# Patient Record
Sex: MALE | Race: BLACK OR AFRICAN AMERICAN | NOT HISPANIC OR LATINO | Employment: UNEMPLOYED | URBAN - METROPOLITAN AREA
[De-identification: names, ages, dates, MRNs, and addresses within clinical notes are randomized per-mention and may not be internally consistent; named-entity substitution may affect disease eponyms.]

---

## 2019-01-20 ENCOUNTER — APPOINTMENT (EMERGENCY)
Dept: RADIOLOGY | Facility: HOSPITAL | Age: 34
DRG: 917 | End: 2019-01-20

## 2019-01-20 ENCOUNTER — HOSPITAL ENCOUNTER (INPATIENT)
Facility: HOSPITAL | Age: 34
LOS: 1 days | Discharge: HOME | DRG: 917 | End: 2019-01-22
Attending: EMERGENCY MEDICINE | Admitting: EMERGENCY MEDICINE

## 2019-01-20 DIAGNOSIS — S05.01XA ABRASION OF RIGHT CORNEA, INITIAL ENCOUNTER: ICD-10-CM

## 2019-01-20 DIAGNOSIS — T50.901A DRUG OVERDOSE: ICD-10-CM

## 2019-01-20 DIAGNOSIS — H57.12 EYE PAIN, LEFT: ICD-10-CM

## 2019-01-20 DIAGNOSIS — R46.89 AGGRESSIVE BEHAVIOR: ICD-10-CM

## 2019-01-20 DIAGNOSIS — H57.11 EYE PAIN, RIGHT: ICD-10-CM

## 2019-01-20 DIAGNOSIS — R41.82 ALTERED MENTAL STATE: Primary | ICD-10-CM

## 2019-01-20 LAB
ALBUMIN SERPL BCP-MCNC: 4.4 G/DL (ref 3.5–5)
ALP SERPL-CCNC: 67 U/L (ref 46–116)
ALT SERPL W P-5'-P-CCNC: 51 U/L (ref 12–78)
AMPHETAMINES SERPL QL SCN: NEGATIVE
ANION GAP SERPL CALCULATED.3IONS-SCNC: 14 MMOL/L (ref 4–13)
APAP SERPL-MCNC: <2 UG/ML (ref 10–30)
AST SERPL W P-5'-P-CCNC: 22 U/L (ref 5–45)
BARBITURATES UR QL: NEGATIVE
BASOPHILS # BLD AUTO: 0.07 THOUSANDS/ΜL (ref 0–0.1)
BASOPHILS NFR BLD AUTO: 1 % (ref 0–1)
BENZODIAZ UR QL: NEGATIVE
BILIRUB SERPL-MCNC: 0.34 MG/DL (ref 0.2–1)
BUN SERPL-MCNC: 7 MG/DL (ref 5–25)
CALCIUM SERPL-MCNC: 9.3 MG/DL (ref 8.3–10.1)
CHLORIDE SERPL-SCNC: 104 MMOL/L (ref 100–108)
CO2 SERPL-SCNC: 21 MMOL/L (ref 21–32)
COCAINE UR QL: POSITIVE
CREAT SERPL-MCNC: 1.16 MG/DL (ref 0.6–1.3)
EOSINOPHIL # BLD AUTO: 0.22 THOUSAND/ΜL (ref 0–0.61)
EOSINOPHIL NFR BLD AUTO: 3 % (ref 0–6)
ERYTHROCYTE [DISTWIDTH] IN BLOOD BY AUTOMATED COUNT: 14.3 % (ref 11.6–15.1)
ETHANOL SERPL-MCNC: 27 MG/DL (ref 0–3)
GFR SERPL CREATININE-BSD FRML MDRD: 96 ML/MIN/1.73SQ M
GLUCOSE SERPL-MCNC: 102 MG/DL (ref 65–140)
HCT VFR BLD AUTO: 49.2 % (ref 36.5–49.3)
HGB BLD-MCNC: 15.9 G/DL (ref 12–17)
IMM GRANULOCYTES # BLD AUTO: 0.02 THOUSAND/UL (ref 0–0.2)
IMM GRANULOCYTES NFR BLD AUTO: 0 % (ref 0–2)
LYMPHOCYTES # BLD AUTO: 2.66 THOUSANDS/ΜL (ref 0.6–4.47)
LYMPHOCYTES NFR BLD AUTO: 36 % (ref 14–44)
MAGNESIUM SERPL-MCNC: 2 MG/DL (ref 1.6–2.6)
MCH RBC QN AUTO: 27.4 PG (ref 26.8–34.3)
MCHC RBC AUTO-ENTMCNC: 32.3 G/DL (ref 31.4–37.4)
MCV RBC AUTO: 85 FL (ref 82–98)
METHADONE UR QL: NEGATIVE
MONOCYTES # BLD AUTO: 0.74 THOUSAND/ΜL (ref 0.17–1.22)
MONOCYTES NFR BLD AUTO: 10 % (ref 4–12)
NEUTROPHILS # BLD AUTO: 3.62 THOUSANDS/ΜL (ref 1.85–7.62)
NEUTS SEG NFR BLD AUTO: 50 % (ref 43–75)
NRBC BLD AUTO-RTO: 0 /100 WBCS
OPIATES UR QL SCN: NEGATIVE
PCP UR QL: POSITIVE
PLATELET # BLD AUTO: 201 THOUSANDS/UL (ref 149–390)
PMV BLD AUTO: 12.2 FL (ref 8.9–12.7)
POTASSIUM SERPL-SCNC: 3.7 MMOL/L (ref 3.5–5.3)
PROT SERPL-MCNC: 8 G/DL (ref 6.4–8.2)
RBC # BLD AUTO: 5.8 MILLION/UL (ref 3.88–5.62)
SALICYLATES SERPL-MCNC: 5 MG/DL (ref 3–20)
SODIUM SERPL-SCNC: 139 MMOL/L (ref 136–145)
THC UR QL: POSITIVE
WBC # BLD AUTO: 7.33 THOUSAND/UL (ref 4.31–10.16)

## 2019-01-20 PROCEDURE — 80307 DRUG TEST PRSMV CHEM ANLYZR: CPT | Performed by: EMERGENCY MEDICINE

## 2019-01-20 PROCEDURE — 5A1935Z RESPIRATORY VENTILATION, LESS THAN 24 CONSECUTIVE HOURS: ICD-10-PCS | Performed by: EMERGENCY MEDICINE

## 2019-01-20 PROCEDURE — 80320 DRUG SCREEN QUANTALCOHOLS: CPT | Performed by: EMERGENCY MEDICINE

## 2019-01-20 PROCEDURE — 80053 COMPREHEN METABOLIC PANEL: CPT | Performed by: EMERGENCY MEDICINE

## 2019-01-20 PROCEDURE — 36415 COLL VENOUS BLD VENIPUNCTURE: CPT | Performed by: EMERGENCY MEDICINE

## 2019-01-20 PROCEDURE — 93005 ELECTROCARDIOGRAM TRACING: CPT

## 2019-01-20 PROCEDURE — 96360 HYDRATION IV INFUSION INIT: CPT

## 2019-01-20 PROCEDURE — 0BJ08ZZ INSPECTION OF TRACHEOBRONCHIAL TREE, VIA NATURAL OR ARTIFICIAL OPENING ENDOSCOPIC: ICD-10-PCS | Performed by: EMERGENCY MEDICINE

## 2019-01-20 PROCEDURE — 85025 COMPLETE CBC W/AUTO DIFF WBC: CPT | Performed by: EMERGENCY MEDICINE

## 2019-01-20 PROCEDURE — 83735 ASSAY OF MAGNESIUM: CPT | Performed by: EMERGENCY MEDICINE

## 2019-01-20 PROCEDURE — 90715 TDAP VACCINE 7 YRS/> IM: CPT | Performed by: EMERGENCY MEDICINE

## 2019-01-20 PROCEDURE — 0BH17EZ INSERTION OF ENDOTRACHEAL AIRWAY INTO TRACHEA, VIA NATURAL OR ARTIFICIAL OPENING: ICD-10-PCS | Performed by: EMERGENCY MEDICINE

## 2019-01-20 PROCEDURE — 94002 VENT MGMT INPAT INIT DAY: CPT

## 2019-01-20 PROCEDURE — 80329 ANALGESICS NON-OPIOID 1 OR 2: CPT | Performed by: EMERGENCY MEDICINE

## 2019-01-20 PROCEDURE — 99285 EMERGENCY DEPT VISIT HI MDM: CPT

## 2019-01-20 PROCEDURE — 70450 CT HEAD/BRAIN W/O DYE: CPT

## 2019-01-20 PROCEDURE — 73130 X-RAY EXAM OF HAND: CPT

## 2019-01-20 PROCEDURE — 94760 N-INVAS EAR/PLS OXIMETRY 1: CPT

## 2019-01-20 PROCEDURE — 71045 X-RAY EXAM CHEST 1 VIEW: CPT

## 2019-01-20 PROCEDURE — 90471 IMMUNIZATION ADMIN: CPT

## 2019-01-20 RX ORDER — VECURONIUM BROMIDE 1 MG/ML
10 INJECTION, POWDER, LYOPHILIZED, FOR SOLUTION INTRAVENOUS ONCE
Status: COMPLETED | OUTPATIENT
Start: 2019-01-20 | End: 2019-01-20

## 2019-01-20 RX ORDER — SUCCINYLCHOLINE CHLORIDE 20 MG/ML
200 INJECTION INTRAMUSCULAR; INTRAVENOUS ONCE
Status: COMPLETED | OUTPATIENT
Start: 2019-01-20 | End: 2019-01-20

## 2019-01-20 RX ORDER — CHLORHEXIDINE GLUCONATE 0.12 MG/ML
15 RINSE ORAL EVERY 12 HOURS SCHEDULED
Status: DISCONTINUED | OUTPATIENT
Start: 2019-01-20 | End: 2019-01-21

## 2019-01-20 RX ORDER — PROPOFOL 10 MG/ML
5-50 INJECTION, EMULSION INTRAVENOUS
Status: DISCONTINUED | OUTPATIENT
Start: 2019-01-20 | End: 2019-01-21

## 2019-01-20 RX ADMIN — Medication 200 MG: at 22:29

## 2019-01-20 RX ADMIN — SODIUM CHLORIDE 1000 ML: 0.9 INJECTION, SOLUTION INTRAVENOUS at 22:44

## 2019-01-20 RX ADMIN — VECURONIUM BROMIDE 10 MG: 10 INJECTION, POWDER, LYOPHILIZED, FOR SOLUTION INTRAVENOUS at 22:30

## 2019-01-20 RX ADMIN — TETANUS TOXOID, REDUCED DIPHTHERIA TOXOID AND ACELLULAR PERTUSSIS VACCINE, ADSORBED 0.5 ML: 5; 2.5; 8; 8; 2.5 SUSPENSION INTRAMUSCULAR at 22:53

## 2019-01-20 RX ADMIN — PROPOFOL 20 MCG/KG/MIN: 10 INJECTION, EMULSION INTRAVENOUS at 22:43

## 2019-01-21 ENCOUNTER — APPOINTMENT (INPATIENT)
Dept: RADIOLOGY | Facility: HOSPITAL | Age: 34
DRG: 917 | End: 2019-01-21

## 2019-01-21 PROBLEM — T50.901A DRUG OVERDOSE: Status: ACTIVE | Noted: 2019-01-21

## 2019-01-21 PROBLEM — E87.2 METABOLIC ACIDOSIS: Status: ACTIVE | Noted: 2019-01-21

## 2019-01-21 PROBLEM — F19.10 POLYSUBSTANCE ABUSE (HCC): Status: ACTIVE | Noted: 2019-01-21

## 2019-01-21 PROBLEM — E87.20 METABOLIC ACIDOSIS: Status: ACTIVE | Noted: 2019-01-21

## 2019-01-21 PROBLEM — N17.9 ACUTE KIDNEY INJURY (HCC): Status: ACTIVE | Noted: 2019-01-21

## 2019-01-21 PROBLEM — J96.01 ACUTE RESPIRATORY FAILURE WITH HYPOXIA (HCC): Status: ACTIVE | Noted: 2019-01-21

## 2019-01-21 PROBLEM — R41.82 ALTERED MENTAL STATE: Status: ACTIVE | Noted: 2019-01-21

## 2019-01-21 PROBLEM — E87.6 HYPOKALEMIA: Status: ACTIVE | Noted: 2019-01-21

## 2019-01-21 PROBLEM — R46.89 AGGRESSIVE BEHAVIOR: Status: ACTIVE | Noted: 2019-01-21

## 2019-01-21 LAB
ANION GAP SERPL CALCULATED.3IONS-SCNC: 7 MMOL/L (ref 4–13)
ARTERIAL PATENCY WRIST A: YES
ATRIAL RATE: 93 BPM
BASE EXCESS BLDA CALC-SCNC: 0.8 MMOL/L
BASOPHILS # BLD AUTO: 0.04 THOUSANDS/ΜL (ref 0–0.1)
BASOPHILS NFR BLD AUTO: 1 % (ref 0–1)
BUN SERPL-MCNC: 8 MG/DL (ref 5–25)
CALCIUM SERPL-MCNC: 8.3 MG/DL (ref 8.3–10.1)
CHLORIDE SERPL-SCNC: 108 MMOL/L (ref 100–108)
CO2 SERPL-SCNC: 25 MMOL/L (ref 21–32)
CREAT SERPL-MCNC: 0.86 MG/DL (ref 0.6–1.3)
EOSINOPHIL # BLD AUTO: 0.04 THOUSAND/ΜL (ref 0–0.61)
EOSINOPHIL NFR BLD AUTO: 1 % (ref 0–6)
ERYTHROCYTE [DISTWIDTH] IN BLOOD BY AUTOMATED COUNT: 13.9 % (ref 11.6–15.1)
GFR SERPL CREATININE-BSD FRML MDRD: 134 ML/MIN/1.73SQ M
GLUCOSE SERPL-MCNC: 91 MG/DL (ref 65–140)
HCO3 BLDA-SCNC: 24.8 MMOL/L (ref 22–28)
HCT VFR BLD AUTO: 42.5 % (ref 36.5–49.3)
HGB BLD-MCNC: 14 G/DL (ref 12–17)
HOROWITZ INDEX BLDA+IHG-RTO: 50 MM[HG]
I-TIME: 1
IMM GRANULOCYTES # BLD AUTO: 0.02 THOUSAND/UL (ref 0–0.2)
IMM GRANULOCYTES NFR BLD AUTO: 0 % (ref 0–2)
LYMPHOCYTES # BLD AUTO: 1.01 THOUSANDS/ΜL (ref 0.6–4.47)
LYMPHOCYTES NFR BLD AUTO: 13 % (ref 14–44)
MAGNESIUM SERPL-MCNC: 2 MG/DL (ref 1.6–2.6)
MCH RBC QN AUTO: 26.9 PG (ref 26.8–34.3)
MCHC RBC AUTO-ENTMCNC: 32.9 G/DL (ref 31.4–37.4)
MCV RBC AUTO: 82 FL (ref 82–98)
MONOCYTES # BLD AUTO: 0.46 THOUSAND/ΜL (ref 0.17–1.22)
MONOCYTES NFR BLD AUTO: 6 % (ref 4–12)
NEUTROPHILS # BLD AUTO: 6.02 THOUSANDS/ΜL (ref 1.85–7.62)
NEUTS SEG NFR BLD AUTO: 79 % (ref 43–75)
NRBC BLD AUTO-RTO: 0 /100 WBCS
O2 CT BLDA-SCNC: 21 ML/DL (ref 16–23)
OXYHGB MFR BLDA: 97.2 % (ref 94–97)
P AXIS: 58 DEGREES
PCO2 BLDA: 37.9 MM HG (ref 36–44)
PEEP RESPIRATORY: 8 CM[H2O]
PH BLDA: 7.43 [PH] (ref 7.35–7.45)
PHOSPHATE SERPL-MCNC: 2 MG/DL (ref 2.7–4.5)
PLATELET # BLD AUTO: 169 THOUSANDS/UL (ref 149–390)
PMV BLD AUTO: 12 FL (ref 8.9–12.7)
PO2 BLDA: 141.2 MM HG (ref 75–129)
POTASSIUM SERPL-SCNC: 3.5 MMOL/L (ref 3.5–5.3)
PR INTERVAL: 160 MS
PRESSURE CONTROL: 18
PROCALCITONIN SERPL-MCNC: <0.05 NG/ML
QRS AXIS: 51 DEGREES
QRSD INTERVAL: 94 MS
QT INTERVAL: 336 MS
QTC INTERVAL: 417 MS
RBC # BLD AUTO: 5.21 MILLION/UL (ref 3.88–5.62)
SODIUM SERPL-SCNC: 140 MMOL/L (ref 136–145)
SPECIMEN SOURCE: ABNORMAL
T WAVE AXIS: 19 DEGREES
VENT AC: 14
VENT- AC: AC
VENTRICULAR RATE: 93 BPM
WBC # BLD AUTO: 7.59 THOUSAND/UL (ref 4.31–10.16)

## 2019-01-21 PROCEDURE — 87040 BLOOD CULTURE FOR BACTERIA: CPT | Performed by: INTERNAL MEDICINE

## 2019-01-21 PROCEDURE — 90686 IIV4 VACC NO PRSV 0.5 ML IM: CPT | Performed by: EMERGENCY MEDICINE

## 2019-01-21 PROCEDURE — 93010 ELECTROCARDIOGRAM REPORT: CPT | Performed by: INTERNAL MEDICINE

## 2019-01-21 PROCEDURE — 99291 CRITICAL CARE FIRST HOUR: CPT | Performed by: EMERGENCY MEDICINE

## 2019-01-21 PROCEDURE — 82805 BLOOD GASES W/O2 SATURATION: CPT | Performed by: EMERGENCY MEDICINE

## 2019-01-21 PROCEDURE — 87070 CULTURE OTHR SPECIMN AEROBIC: CPT | Performed by: EMERGENCY MEDICINE

## 2019-01-21 PROCEDURE — 94003 VENT MGMT INPAT SUBQ DAY: CPT

## 2019-01-21 PROCEDURE — 84100 ASSAY OF PHOSPHORUS: CPT | Performed by: EMERGENCY MEDICINE

## 2019-01-21 PROCEDURE — 94640 AIRWAY INHALATION TREATMENT: CPT

## 2019-01-21 PROCEDURE — 84145 PROCALCITONIN (PCT): CPT | Performed by: PHYSICIAN ASSISTANT

## 2019-01-21 PROCEDURE — 94760 N-INVAS EAR/PLS OXIMETRY 1: CPT

## 2019-01-21 PROCEDURE — 99254 IP/OBS CNSLTJ NEW/EST MOD 60: CPT | Performed by: PSYCHIATRY & NEUROLOGY

## 2019-01-21 PROCEDURE — 80048 BASIC METABOLIC PNL TOTAL CA: CPT | Performed by: EMERGENCY MEDICINE

## 2019-01-21 PROCEDURE — 83735 ASSAY OF MAGNESIUM: CPT | Performed by: EMERGENCY MEDICINE

## 2019-01-21 PROCEDURE — 96374 THER/PROPH/DIAG INJ IV PUSH: CPT

## 2019-01-21 PROCEDURE — 36600 WITHDRAWAL OF ARTERIAL BLOOD: CPT

## 2019-01-21 PROCEDURE — 85025 COMPLETE CBC W/AUTO DIFF WBC: CPT | Performed by: EMERGENCY MEDICINE

## 2019-01-21 PROCEDURE — 71045 X-RAY EXAM CHEST 1 VIEW: CPT

## 2019-01-21 RX ORDER — PROPARACAINE HYDROCHLORIDE 5 MG/ML
1 SOLUTION/ DROPS OPHTHALMIC ONCE
Status: DISCONTINUED | OUTPATIENT
Start: 2019-01-21 | End: 2019-01-21

## 2019-01-21 RX ORDER — LIDOCAINE HYDROCHLORIDE 10 MG/ML
10 INJECTION, SOLUTION EPIDURAL; INFILTRATION; INTRACAUDAL; PERINEURAL ONCE
Status: DISCONTINUED | OUTPATIENT
Start: 2019-01-21 | End: 2019-01-21

## 2019-01-21 RX ORDER — PROPARACAINE HYDROCHLORIDE 5 MG/ML
1 SOLUTION/ DROPS OPHTHALMIC ONCE
Status: COMPLETED | OUTPATIENT
Start: 2019-01-21 | End: 2019-01-21

## 2019-01-21 RX ORDER — SODIUM CHLORIDE, SODIUM GLUCONATE, SODIUM ACETATE, POTASSIUM CHLORIDE, MAGNESIUM CHLORIDE, SODIUM PHOSPHATE, DIBASIC, AND POTASSIUM PHOSPHATE .53; .5; .37; .037; .03; .012; .00082 G/100ML; G/100ML; G/100ML; G/100ML; G/100ML; G/100ML; G/100ML
125 INJECTION, SOLUTION INTRAVENOUS CONTINUOUS
Status: DISCONTINUED | OUTPATIENT
Start: 2019-01-21 | End: 2019-01-21

## 2019-01-21 RX ORDER — KETAMINE HCL IN NACL, ISO-OSM 100MG/10ML
100 SYRINGE (ML) INJECTION ONCE
Status: COMPLETED | OUTPATIENT
Start: 2019-01-21 | End: 2019-01-21

## 2019-01-21 RX ORDER — POTASSIUM CHLORIDE 20MEQ/15ML
40 LIQUID (ML) ORAL ONCE
Status: DISCONTINUED | OUTPATIENT
Start: 2019-01-21 | End: 2019-01-21

## 2019-01-21 RX ORDER — SODIUM CHLORIDE, SODIUM GLUCONATE, SODIUM ACETATE, POTASSIUM CHLORIDE, MAGNESIUM CHLORIDE, SODIUM PHOSPHATE, DIBASIC, AND POTASSIUM PHOSPHATE .53; .5; .37; .037; .03; .012; .00082 G/100ML; G/100ML; G/100ML; G/100ML; G/100ML; G/100ML; G/100ML
1000 INJECTION, SOLUTION INTRAVENOUS ONCE
Status: COMPLETED | OUTPATIENT
Start: 2019-01-21 | End: 2019-01-21

## 2019-01-21 RX ORDER — OXYCODONE HYDROCHLORIDE 5 MG/1
5 TABLET ORAL EVERY 6 HOURS PRN
Status: DISCONTINUED | OUTPATIENT
Start: 2019-01-21 | End: 2019-01-21

## 2019-01-21 RX ORDER — VECURONIUM BROMIDE 1 MG/ML
10 INJECTION, POWDER, LYOPHILIZED, FOR SOLUTION INTRAVENOUS ONCE
Status: DISCONTINUED | OUTPATIENT
Start: 2019-01-21 | End: 2019-01-21

## 2019-01-21 RX ORDER — FENTANYL CITRATE 50 UG/ML
200 INJECTION, SOLUTION INTRAMUSCULAR; INTRAVENOUS ONCE
Status: COMPLETED | OUTPATIENT
Start: 2019-01-21 | End: 2019-01-21

## 2019-01-21 RX ORDER — ALBUTEROL SULFATE 2.5 MG/3ML
SOLUTION RESPIRATORY (INHALATION)
Status: COMPLETED
Start: 2019-01-21 | End: 2019-01-21

## 2019-01-21 RX ORDER — ERYTHROMYCIN 5 MG/G
0.5 OINTMENT OPHTHALMIC
Status: DISCONTINUED | OUTPATIENT
Start: 2019-01-21 | End: 2019-01-22 | Stop reason: HOSPADM

## 2019-01-21 RX ORDER — POTASSIUM CHLORIDE 20MEQ/15ML
20 LIQUID (ML) ORAL ONCE
Status: COMPLETED | OUTPATIENT
Start: 2019-01-21 | End: 2019-01-21

## 2019-01-21 RX ORDER — SODIUM CHLORIDE, SODIUM GLUCONATE, SODIUM ACETATE, POTASSIUM CHLORIDE, MAGNESIUM CHLORIDE, SODIUM PHOSPHATE, DIBASIC, AND POTASSIUM PHOSPHATE .53; .5; .37; .037; .03; .012; .00082 G/100ML; G/100ML; G/100ML; G/100ML; G/100ML; G/100ML; G/100ML
1000 INJECTION, SOLUTION INTRAVENOUS ONCE
Status: DISCONTINUED | OUTPATIENT
Start: 2019-01-21 | End: 2019-01-21

## 2019-01-21 RX ORDER — NICOTINE 21 MG/24HR
14 PATCH, TRANSDERMAL 24 HOURS TRANSDERMAL DAILY
Status: DISCONTINUED | OUTPATIENT
Start: 2019-01-22 | End: 2019-01-21

## 2019-01-21 RX ORDER — FENTANYL CITRATE 50 UG/ML
200 INJECTION, SOLUTION INTRAMUSCULAR; INTRAVENOUS ONCE
Status: DISCONTINUED | OUTPATIENT
Start: 2019-01-21 | End: 2019-01-21

## 2019-01-21 RX ORDER — TRAMADOL HYDROCHLORIDE 50 MG/1
50 TABLET ORAL EVERY 6 HOURS PRN
Status: DISCONTINUED | OUTPATIENT
Start: 2019-01-21 | End: 2019-01-21

## 2019-01-21 RX ORDER — LIDOCAINE HYDROCHLORIDE 10 MG/ML
10 INJECTION, SOLUTION EPIDURAL; INFILTRATION; INTRACAUDAL; PERINEURAL ONCE
Status: COMPLETED | OUTPATIENT
Start: 2019-01-21 | End: 2019-01-21

## 2019-01-21 RX ORDER — POTASSIUM CHLORIDE 20MEQ/15ML
20 LIQUID (ML) ORAL ONCE
Status: DISCONTINUED | OUTPATIENT
Start: 2019-01-21 | End: 2019-01-21

## 2019-01-21 RX ORDER — TRAMADOL HYDROCHLORIDE 50 MG/1
50 TABLET ORAL EVERY 8 HOURS PRN
Status: DISCONTINUED | OUTPATIENT
Start: 2019-01-21 | End: 2019-01-22 | Stop reason: HOSPADM

## 2019-01-21 RX ORDER — ALBUTEROL SULFATE 90 UG/1
2 AEROSOL, METERED RESPIRATORY (INHALATION) EVERY 6 HOURS PRN
COMMUNITY

## 2019-01-21 RX ORDER — LIDOCAINE HYDROCHLORIDE 10 MG/ML
INJECTION, SOLUTION EPIDURAL; INFILTRATION; INTRACAUDAL; PERINEURAL
Status: COMPLETED
Start: 2019-01-21 | End: 2019-01-21

## 2019-01-21 RX ORDER — CHLORHEXIDINE GLUCONATE 0.12 MG/ML
15 RINSE ORAL EVERY 12 HOURS SCHEDULED
Status: DISCONTINUED | OUTPATIENT
Start: 2019-01-21 | End: 2019-01-21

## 2019-01-21 RX ORDER — SUCCINYLCHOLINE CHLORIDE 20 MG/ML
200 INJECTION INTRAMUSCULAR; INTRAVENOUS ONCE
Status: DISCONTINUED | OUTPATIENT
Start: 2019-01-21 | End: 2019-01-21

## 2019-01-21 RX ORDER — KETAMINE HCL IN NACL, ISO-OSM 100MG/10ML
100 SYRINGE (ML) INJECTION ONCE
Status: DISCONTINUED | OUTPATIENT
Start: 2019-01-21 | End: 2019-01-21

## 2019-01-21 RX ORDER — ACETAMINOPHEN 325 MG/1
975 TABLET ORAL EVERY 8 HOURS PRN
Status: DISCONTINUED | OUTPATIENT
Start: 2019-01-21 | End: 2019-01-22 | Stop reason: HOSPADM

## 2019-01-21 RX ORDER — VECURONIUM BROMIDE 1 MG/ML
INJECTION, POWDER, LYOPHILIZED, FOR SOLUTION INTRAVENOUS
Status: COMPLETED
Start: 2019-01-21 | End: 2019-01-21

## 2019-01-21 RX ORDER — POLYVINYL ALCOHOL 14 MG/ML
2 SOLUTION/ DROPS OPHTHALMIC
Status: DISCONTINUED | OUTPATIENT
Start: 2019-01-21 | End: 2019-01-22 | Stop reason: HOSPADM

## 2019-01-21 RX ORDER — PHENYLEPHRINE HCL 2.5 %
1 DROPS OPHTHALMIC (EYE) ONCE
Status: DISCONTINUED | OUTPATIENT
Start: 2019-01-21 | End: 2019-01-21

## 2019-01-21 RX ORDER — ALBUTEROL SULFATE 2.5 MG/3ML
2.5 SOLUTION RESPIRATORY (INHALATION) EVERY 4 HOURS PRN
Status: DISCONTINUED | OUTPATIENT
Start: 2019-01-21 | End: 2019-01-22 | Stop reason: HOSPADM

## 2019-01-21 RX ORDER — PROPOFOL 10 MG/ML
INJECTION, EMULSION INTRAVENOUS
Status: COMPLETED
Start: 2019-01-21 | End: 2019-01-21

## 2019-01-21 RX ORDER — ERYTHROMYCIN 5 MG/G
0.5 OINTMENT OPHTHALMIC EVERY 6 HOURS SCHEDULED
Status: DISCONTINUED | OUTPATIENT
Start: 2019-01-21 | End: 2019-01-21

## 2019-01-21 RX ORDER — NICOTINE 21 MG/24HR
14 PATCH, TRANSDERMAL 24 HOURS TRANSDERMAL DAILY
Status: DISCONTINUED | OUTPATIENT
Start: 2019-01-21 | End: 2019-01-22 | Stop reason: HOSPADM

## 2019-01-21 RX ORDER — VECURONIUM BROMIDE 1 MG/ML
10 INJECTION, POWDER, LYOPHILIZED, FOR SOLUTION INTRAVENOUS ONCE
Status: COMPLETED | OUTPATIENT
Start: 2019-01-21 | End: 2019-01-21

## 2019-01-21 RX ADMIN — PROPOFOL 80 MG: 10 INJECTION, EMULSION INTRAVENOUS at 02:06

## 2019-01-21 RX ADMIN — ALBUTEROL SULFATE 2.5 MG: 2.5 SOLUTION RESPIRATORY (INHALATION) at 14:33

## 2019-01-21 RX ADMIN — POTASSIUM & SODIUM PHOSPHATES POWDER PACK 280-160-250 MG 2 PACKET: 280-160-250 PACK at 08:12

## 2019-01-21 RX ADMIN — Medication 100 MG: at 02:47

## 2019-01-21 RX ADMIN — FENTANYL CITRATE 200 MCG: 50 INJECTION, SOLUTION INTRAMUSCULAR; INTRAVENOUS at 00:45

## 2019-01-21 RX ADMIN — NICOTINE 14 MG: 14 PATCH TRANSDERMAL at 17:35

## 2019-01-21 RX ADMIN — ERYTHROMYCIN 0.5 INCH: 5 OINTMENT OPHTHALMIC at 20:38

## 2019-01-21 RX ADMIN — TRAMADOL HYDROCHLORIDE 50 MG: 50 TABLET, COATED ORAL at 22:19

## 2019-01-21 RX ADMIN — ERYTHROMYCIN 0.5 INCH: 5 OINTMENT OPHTHALMIC at 22:16

## 2019-01-21 RX ADMIN — OXYCODONE HYDROCHLORIDE 5 MG: 5 TABLET ORAL at 19:15

## 2019-01-21 RX ADMIN — PROPOFOL 40 MCG/KG/MIN: 10 INJECTION, EMULSION INTRAVENOUS at 01:27

## 2019-01-21 RX ADMIN — FLUORESCEIN SODIUM 1 STRIP: 0.6 STRIP OPHTHALMIC at 13:51

## 2019-01-21 RX ADMIN — ERYTHROMYCIN 0.5 INCH: 5 OINTMENT OPHTHALMIC at 17:22

## 2019-01-21 RX ADMIN — PROPARACAINE HYDROCHLORIDE 1 DROP: 5 SOLUTION/ DROPS OPHTHALMIC at 17:22

## 2019-01-21 RX ADMIN — VECURONIUM BROMIDE 10 MG: 1 INJECTION, POWDER, LYOPHILIZED, FOR SOLUTION INTRAVENOUS at 03:35

## 2019-01-21 RX ADMIN — PROPOFOL 50 MCG/KG/MIN: 10 INJECTION, EMULSION INTRAVENOUS at 08:12

## 2019-01-21 RX ADMIN — POTASSIUM CHLORIDE 20 MEQ: 20 SOLUTION ORAL at 04:32

## 2019-01-21 RX ADMIN — DEXMEDETOMIDINE 0.4 MCG/KG/HR: 100 INJECTION, SOLUTION, CONCENTRATE INTRAVENOUS at 06:24

## 2019-01-21 RX ADMIN — DEXMEDETOMIDINE 0.5 MCG/KG/HR: 100 INJECTION, SOLUTION, CONCENTRATE INTRAVENOUS at 08:19

## 2019-01-21 RX ADMIN — LIDOCAINE HYDROCHLORIDE 10 ML: 10 INJECTION, SOLUTION EPIDURAL; INFILTRATION; INTRACAUDAL; PERINEURAL at 03:32

## 2019-01-21 RX ADMIN — SODIUM CHLORIDE, SODIUM GLUCONATE, SODIUM ACETATE, POTASSIUM CHLORIDE, MAGNESIUM CHLORIDE, SODIUM PHOSPHATE, DIBASIC, AND POTASSIUM PHOSPHATE 125 ML/HR: .53; .5; .37; .037; .03; .012; .00082 INJECTION, SOLUTION INTRAVENOUS at 03:56

## 2019-01-21 RX ADMIN — ALBUTEROL SULFATE: 2.5 SOLUTION RESPIRATORY (INHALATION) at 16:10

## 2019-01-21 RX ADMIN — ENOXAPARIN SODIUM 40 MG: 40 INJECTION SUBCUTANEOUS at 08:12

## 2019-01-21 RX ADMIN — PROPARACAINE HYDROCHLORIDE 1 DROP: 5 SOLUTION/ DROPS OPHTHALMIC at 13:51

## 2019-01-21 RX ADMIN — POLYVINYL ALCOHOL 2 DROP: 14 SOLUTION/ DROPS OPHTHALMIC at 23:36

## 2019-01-21 RX ADMIN — PROPOFOL 50 MCG/KG/MIN: 10 INJECTION, EMULSION INTRAVENOUS at 04:53

## 2019-01-21 RX ADMIN — SODIUM CHLORIDE, SODIUM GLUCONATE, SODIUM ACETATE, POTASSIUM CHLORIDE, MAGNESIUM CHLORIDE, SODIUM PHOSPHATE, DIBASIC, AND POTASSIUM PHOSPHATE 1000 ML: .53; .5; .37; .037; .03; .012; .00082 INJECTION, SOLUTION INTRAVENOUS at 03:56

## 2019-01-21 RX ADMIN — CHLORHEXIDINE GLUCONATE 0.12% ORAL RINSE 15 ML: 1.2 LIQUID ORAL at 08:15

## 2019-01-21 RX ADMIN — FENTANYL CITRATE 150 MCG/HR: 50 INJECTION, SOLUTION INTRAMUSCULAR; INTRAVENOUS at 01:21

## 2019-01-21 RX ADMIN — ALBUTEROL SULFATE 2.5 MG: 2.5 SOLUTION RESPIRATORY (INHALATION) at 23:25

## 2019-01-21 RX ADMIN — POTASSIUM CHLORIDE 20 MEQ: 20 SOLUTION ORAL at 08:11

## 2019-01-21 RX ADMIN — INFLUENZA VIRUS VACCINE 0.5 ML: 15; 15; 15; 15 SUSPENSION INTRAMUSCULAR at 17:35

## 2019-01-21 NOTE — RESPIRATORY THERAPY NOTE
RT Ventilator Management Note  Patrick Santos 34 y o  male MRN: [id number]  Unit/Bed#: ED 13 Encounter: [id number]      Daily Screen     No data found  Physical Exam:   Assessment Type: Assess only  General Appearance: Sedated  Respiratory Pattern: Assisted  Chest Assessment: Chest expansion symmetrical  Bilateral Breath Sounds: Coarse, Expiratory wheezes  O2 Device: Vent  Subjective Data: Pt is intubated      Resp Comments: (P) Pt  back from CT of head  Placed on vent; settings as before

## 2019-01-21 NOTE — ED ATTENDING ATTESTATION
Malinda Contreras DO, saw and evaluated the patient  I have discussed the patient with the resident/non-physician practitioner and agree with the resident's/non-physician practitioner's findings, Plan of Care, and MDM as documented in the resident's/non-physician practitioner's note, except where noted  All available labs and Radiology studies were reviewed  At this point I agree with the current assessment done in the Emergency Department  I have conducted an independent evaluation of this patient a history and physical is as follows:      35 yo male BIBA w/[facility] for evaluation after a friend(?) dropped him off at home(?) where he was then found extremely agitated fighting imaginary people, and real people, as well as stationary objects  There is a report that he may have smoked "wet"  Pt required ketamine 400mg IM which had no effect, he was then given versed 5mg IM x 1 also without effect  Pt still highly agitated per EMS, command given for additional dose ketamine 400mg IM  All of the medications seemed to have taken effect just as he arrived in the ambulance bay  Pt was taken to room 13 for IV placement, obtaining labs while he was still handcuffed in case he became violent again  Pt was intubated to facilitate emergent evaluation including labs for tox eval, CT head  Will rapidly evaluate for emergent condition and monitor closely  Will require admission for further eval and tx  Imp: acute encephalopathy and severe violent agitation  Likely due to toxic ingestion - PCP  Plan: continue sedation, mechanical ventilation, labs and CT head as above        Critical Care Time  The patient presented with a condition in which there was a high probability of imminent or life-threatening deterioration, and critical care services (excluding separately billable procedures) totalled 30-74 minutes (32 minutes for obtaining hx from EMS, evaluation of pt, interpretation of labs and imaging studies, ventilator management, sedation  )              Procedures

## 2019-01-21 NOTE — ED PROVIDER NOTES
History  Chief Complaint   Patient presents with    Overdose - Accidental     EMS reports pt was dropped off out of a unknown vehicle and began punching sidewalk, police called to scene due to pt was acting altered  IM ketamine given by EMS to control pt while in back of ambulance  This is an otherwise healthy 21-year-old male who presents with altered mental status from possible drug overdose  Per report from EMS, the patient was dropped off by friends at his house  The patient was on the street and became violent  When EMS arrived, the patient was nonverbal but extremely agitated  The patient was ultimately given 800 mg of IM ketamine and 5 mg of Versed  Upon arrival, the patient had sonorous respirations but was still moving all extremities  The decision was made to intubate  The patient was given 200 mg of succinylcholine and 10 mg of vecuronium  The patient was successfully intubated with an 8 0 tube using the glide scope  Family will arrive shortly  None       Past Medical History:   Diagnosis Date    Asthma        No past surgical history on file  No family history on file  I have reviewed and agree with the history as documented      Social History   Substance Use Topics    Smoking status: Current Every Day Smoker     Types: Cigarettes    Smokeless tobacco: Not on file    Alcohol use Yes      Comment: social        Review of Systems   Unable to perform ROS: Mental status change       Physical Exam  ED Triage Vitals   Temperature Pulse Respirations Blood Pressure SpO2   01/20/19 2246 01/20/19 2231 01/20/19 2231 01/20/19 2231 01/20/19 2225   98 4 °F (36 9 °C) 90 14 144/75 99 %      Temp Source Heart Rate Source Patient Position - Orthostatic VS BP Location FiO2 (%)   01/20/19 2246 01/20/19 2231 01/20/19 2231 01/20/19 2231 --   Rectal Monitor Lying Right arm       Pain Score       --                  Orthostatic Vital Signs  Vitals:    01/20/19 2231 01/20/19 2300 01/20/19 2315 01/21/19 0015   BP: 144/75 137/82 136/79 141/85   Pulse: 90 100 80 92   Patient Position - Orthostatic VS: Lying Lying Lying Lying       Physical Exam   Constitutional: Vital signs are normal  He appears well-developed and well-nourished  He is cooperative  No distress  HENT:   Mouth/Throat: Uvula is midline and oropharynx is clear and moist    Eyes: Pupils are equal, round, and reactive to light  Conjunctivae, EOM and lids are normal    Neck: Trachea normal  No thyroid mass and no thyromegaly present  Cardiovascular: Normal rate, regular rhythm, normal heart sounds, intact distal pulses and normal pulses  No murmur heard  Pulmonary/Chest: Effort normal and breath sounds normal    Sonorous respirations  Abdominal: Soft  Normal appearance and bowel sounds are normal  There is no tenderness  There is no rebound, no guarding, no CVA tenderness and negative Lobo's sign  Musculoskeletal:   Abrasions to bilateral hands  Neurological: He is alert  Skin: Skin is warm, dry and intact         ED Medications  Medications   propofol (DIPRIVAN) 1000 mg in 100 mL infusion (premix) (50 mcg/kg/min × 110 kg Intravenous Rate/Dose Change 1/21/19 0013)   chlorhexidine (PERIDEX) 0 12 % oral rinse 15 mL (not administered)   fentaNYL 1250 mcg in sodium chloride 0 9% 125mL drip (not administered)    EMS REPLENISHMENT MED ( Does not apply Given to EMS 1/20/19 2244)   sodium chloride 0 9 % bolus 1,000 mL (0 mL Intravenous Stopped 1/21/19 0003)   tetanus-diphtheria-acellular pertussis (BOOSTRIX) IM injection 0 5 mL (0 5 mL Intramuscular Given 1/20/19 2253)   succinylcholine (ANECTINE) 20 mg/mL injection 200 mg (200 mg Intravenous Given 1/20/19 2229)   vecuronium (NORCURON) injection 10 mg (10 mg Intravenous Given 1/20/19 2230)   fentanyl citrate (PF) 100 MCG/2ML 200 mcg (200 mcg Intravenous Given 1/21/19 0045)       Diagnostic Studies  Results Reviewed     Procedure Component Value Units Date/Time    Comprehensive metabolic panel [[id number]]  (Abnormal) Collected:  01/20/19 2238    Lab Status:  Final result Specimen:  Blood from Arm, Left Updated:  01/20/19 2350     Sodium 139 mmol/L      Potassium 3 7 mmol/L      Chloride 104 mmol/L      CO2 21 mmol/L      ANION GAP 14 (H) mmol/L      BUN 7 mg/dL      Creatinine 1 16 mg/dL      Glucose 102 mg/dL      Calcium 9 3 mg/dL      AST 22 U/L      ALT 51 U/L      Alkaline Phosphatase 67 U/L      Total Protein 8 0 g/dL      Albumin 4 4 g/dL      Total Bilirubin 0 34 mg/dL      eGFR 96 ml/min/1 73sq m     Narrative:         National Kidney Disease Education Program recommendations are as follows:  GFR calculation is accurate only with a steady state creatinine  Chronic Kidney disease less than 60 ml/min/1 73 sq  meters  Kidney failure less than 15 ml/min/1 73 sq  meters  Rapid drug screen, urine [[id number]]  (Abnormal) Collected:  01/20/19 2239    Lab Status:  Final result Specimen:  Urine from Urine, Catheter Updated:  01/20/19 2350     Amph/Meth UR Negative     Barbiturate Ur Negative     Benzodiazepine Urine Negative     Cocaine Urine Positive (A)     Methadone Urine Negative     Opiate Urine Negative     PCP Ur Positive (A)     THC Urine Positive (A)    Narrative:         Presumptive report  If requested, specimen will be sent to reference lab for confirmation  FOR MEDICAL PURPOSES ONLY  IF CONFIRMATION NEEDED PLEASE CONTACT THE LAB WITHIN 5 DAYS      Drug Screen Cutoff Levels:  AMPHETAMINE/METHAMPHETAMINES  1000 ng/mL  BARBITURATES     200 ng/mL  BENZODIAZEPINES     200 ng/mL  COCAINE      300 ng/mL  METHADONE      300 ng/mL  OPIATES      300 ng/mL  PHENCYCLIDINE     25 ng/mL  THC       50 ng/mL    Acetaminophen level [[id number]]  (Abnormal) Collected:  01/20/19 2238    Lab Status:  Final result Specimen:  Blood from Arm, Left Updated:  01/20/19 2350     Acetaminophen Level <2 (L) ug/mL     Salicylate level [[id number]]  (Normal) Collected:  01/20/19 2238    Lab Status:  Final result Specimen:  Blood from Arm, Left Updated:  39/43/45 3970     Salicylate Lvl 5 mg/dL     Magnesium [[id number]]  (Normal) Collected:  01/20/19 2238    Lab Status:  Final result Specimen:  Blood from Arm, Left Updated:  01/20/19 2332     Magnesium 2 0 mg/dL     Ethanol [[id number]]  (Abnormal) Collected:  01/20/19 2238    Lab Status:  Final result Specimen:  Blood from Arm, Left Updated:  01/20/19 2313     Ethanol Lvl 27 (H) mg/dL     CBC and differential [[id number]]  (Abnormal) Collected:  01/20/19 2238    Lab Status:  Final result Specimen:  Blood from Arm, Left Updated:  01/20/19 2307     WBC 7 33 Thousand/uL      RBC 5 80 (H) Million/uL      Hemoglobin 15 9 g/dL      Hematocrit 49 2 %      MCV 85 fL      MCH 27 4 pg      MCHC 32 3 g/dL      RDW 14 3 %      MPV 12 2 fL      Platelets 695 Thousands/uL      nRBC 0 /100 WBCs      Neutrophils Relative 50 %      Immat GRANS % 0 %      Lymphocytes Relative 36 %      Monocytes Relative 10 %      Eosinophils Relative 3 %      Basophils Relative 1 %      Neutrophils Absolute 3 62 Thousands/µL      Immature Grans Absolute 0 02 Thousand/uL      Lymphocytes Absolute 2 66 Thousands/µL      Monocytes Absolute 0 74 Thousand/µL      Eosinophils Absolute 0 22 Thousand/µL      Basophils Absolute 0 07 Thousands/µL                  CT head without contrast   Final Result by Ekaterina Avila MD (01/20 2357)      No acute intracranial abnormality                    Workstation performed: [id number]         XR chest 1 view portable    (Results Pending)   XR hand 3+ views RIGHT    (Results Pending)   XR hand 3+ views LEFT    (Results Pending)         Procedures  ECG 12 Lead Documentation  Date/Time: 1/20/2019 10:45 PM  Performed by: Krystal Haley  Authorized by: Krystal Haley     ECG reviewed by me, the ED Provider: yes    Patient location:  ED  Previous ECG:     Previous ECG:  Compared to current    Similarity:  No change    Comparison to cardiac monitor: Yes    Interpretation: Interpretation: normal    Rate:     ECG rate:  93    ECG rate assessment: normal    Rhythm:     Rhythm: sinus rhythm    Ectopy:     Ectopy: none    QRS:     QRS axis:  Normal    QRS intervals:  Normal  Conduction:     Conduction: normal    ST segments:     ST segments:  Normal  T waves:     T waves: normal      Intubation  Date/Time: 1/20/2019 10:25 PM  Performed by: Boyd Hodgkin  Authorized by: Boyd Hodgkin     Patient location:  ED  Other Assisting Provider: No    Consent:     Consent obtained:  Emergent situation  Pre-procedure details:     Patient status:  Altered mental status    Mallampati score:  1    Pretreatment medications:  Ketamine and midazolam    Paralytics:  Vecuronium and succinylcholine  Indications:     Indications for intubation: airway protection    Procedure details:     Preoxygenation:  Bag valve mask    CPR in progress: no      Intubation method:  Oral    Oral intubation technique:  Glidescope    Laryngoscope blade: Mac 4    Tube size (mm):  8 0    Tube type:  Cuffed    Number of attempts:  1    Ventilation between attempts: no      Cricoid pressure: no      Tube visualized through cords: yes    Placement assessment:     ETT to teeth:  25    Tube secured with:  ETT garg    Breath sounds:  Equal and absent over the epigastrium    Placement verification: chest rise, condensation, CXR verification, direct visualization, equal breath sounds, esophageal detector, ETCO2 detector and tube exhalation      CXR findings:  ETT in proper place  Post-procedure details:     Patient tolerance of procedure: Tolerated well, no immediate complications          Phone Consults  ED Phone Contact    ED Course  ED Course as of Jan 21 0046   Lorenzo Billingsley Jan 20, 2019   0123 Spoke with friends and family  They believe the patient smoked PCP and is having an bad trip  Per report, the patient has smoked PCP in the past   The patient only has a medical history of asthma according to the mother    The patient was not witnessed doing drugs  MDM  Number of Diagnoses or Management Options  Diagnosis management comments: Labs, EKG, rapid urine drug screen  Chest x-ray for ET tube placement  Bilateral hand x-rays  CT head for altered mental status  Ultimately, admit to the ICU  CritCare Time    Disposition  Final diagnoses: Altered mental state   Drug overdose   Aggressive behavior     Time reflects when diagnosis was documented in both MDM as applicable and the Disposition within this note     Time User Action Codes Description Comment    1/20/2019 11:07 PM Jessica Nails Add [R41 82] Altered mental state     1/21/2019 12:45 AM Anahi Angle P Add [T50 901A] Drug overdose     1/21/2019 12:45 AM Jessica Nails Add [R46 89] Aggressive behavior       ED Disposition     ED Disposition Condition Comment    Admit  Case was discussed with Dr Teo Forde and the patient's admission status was agreed to be Admission Status: inpatient status to the service of critical care   Follow-up Information    None         Patient's Medications    No medications on file     No discharge procedures on file  ED Provider  Attending physically available and evaluated [facility]  I managed the patient along with the ED Attending      Electronically Signed by         Jose Eduardo Lindsay MD  01/21/19 9522

## 2019-01-21 NOTE — UTILIZATION REVIEW
Initial Clinical Review    Admission: Date/Time/Statement: 1/21/19 @ 0046 - care initiated 1/20/19 @ 2237    Orders Placed This Encounter   Procedures    Inpatient Admission (expected length of stay for this patient is greater than two midnights)     Standing Status:   Standing     Number of Occurrences:   1     Order Specific Question:   Admitting Physician     Answer:   Dora Ortiz [[id number]]     Order Specific Question:   Level of Care     Answer:   Critical Care [15]     Order Specific Question:   Estimated length of stay     Answer:   More than 2 Midnights     Order Specific Question:   Certification     Answer:   I certify that inpatient services are medically necessary for this patient for a duration of greater than two midnights  See H&P and MD Progress Notes for additional information about the patient's course of treatment  ED: Date/Time/Mode of Arrival:   ED Arrival Information     Expected Arrival Acuity Means of Arrival Escorted By Service Admission Type    - 1/20/2019 22:22 Immediate Ambulance [facility] [place] [facility] EMS Critical Care/ICU Emergency    Arrival Complaint    Drug Overdose? Chief Complaint:   Chief Complaint   Patient presents with    Overdose - Accidental     EMS reports pt was dropped off out of a unknown vehicle and began punching sidewalk, police called to scene due to pt was acting altered  IM ketamine given by EMS to control pt while in back of ambulance  History of Illness: Susan Bolaños is a 34 y o  male who presents to the emergency department by ambulance, patient was reported to be dropped off by his friend at home and was extremely agitated and fighting with imaginary people  He was also fighting with renal people and stationary objects as per ED report  There was report patient may have smoked wet  Patient received a total of 800 of ketamine IM as well as Versed 5 mg IM by EMS and was still agitated  Patient arrived handcuffed to stretcher    Patient was initially calm and then became violent again  Endotracheal intubation was performed as patient was harmful to himself and staff and he was not able to be cared for appropriately  While in the emergency department patient had episodes of desaturation when he wakes up  Patient becomes very agitated when sedation is held, he moves all extremities and attempts to sit up in bed and will flail his extremities  Chest x-ray with right lower lobe consolidation likely from aspiration  Urinary drug screen was positive for PCP, marijuana and cocaine     ED Vital Signs:   ED Triage Vitals   Temperature Pulse Respirations Blood Pressure SpO2   01/20/19 2246 01/20/19 2231 01/20/19 2231 01/20/19 2231 01/20/19 2225   98 4 °F (36 9 °C) 90 14 144/75 99 %      Temp Source Heart Rate Source Patient Position - Orthostatic VS BP Location FiO2 (%)   01/20/19 2246 01/20/19 2231 01/20/19 2231 01/20/19 2231 01/21/19 0300   Rectal Monitor Lying Right arm 50      Pain Score       01/21/19 0115       No Pain        Wt Readings from Last 1 Encounters:   01/21/19 97 4 kg (214 lb 11 7 oz)     Vital Signs (abnormal):   01/21/19 0500  --  68   26  143/94  110  99 %   01/21/19 0115   96 8 °F (36 °C)  76  14  102/61  --  94 %     Pertinent Labs: Anion gap 14  pO 2 141  3  Phos 2 0  Medical alcohol 27  UDS + THC, Cocaine, Phencyclidine    Diagnostic Test Results:     1/20 CXR - Right basilar opacity which may represent atelectasis or infiltrate with possible small right pleural effusion  Follow-up advised  1/20 Bilat hand xrays - no acute injuries  1/20 CT head - no abnormality   1/21 CXR - Improved right basilar aeration with some residual atelectasis or infiltrate    1/20 ECG - NSR     ED Treatment:   Medication Administration from 01/20/2019 2222 to 01/21/2019 0226    Date/Time Order Dose Route Action   01/20/2019 2244 sodium chloride 0 9 % bolus 1,000 mL 1,000 mL Intravenous New Bag   01/20/2019 2253 tetanus-diphtheria-acellular pertussis (BOOSTRIX) IM injection 0 5 mL 0 5 mL Intramuscular Given   01/21/2019 0206 propofol (DIPRIVAN) 1000 mg in 100 mL infusion (premix) 50 mcg/kg/min Intravenous Rate/Dose Change   01/21/2019 0127 propofol (DIPRIVAN) 1000 mg in 100 mL infusion (premix) 40 mcg/kg/min Intravenous New Bag   01/21/2019 0013 propofol (DIPRIVAN) 1000 mg in 100 mL infusion (premix) 50 mcg/kg/min Intravenous Rate/Dose Change   01/21/2019 0003 propofol (DIPRIVAN) 1000 mg in 100 mL infusion (premix) 30 mcg/kg/min Intravenous Rate/Dose Change   01/20/2019 2343 propofol (DIPRIVAN) 1000 mg in 100 mL infusion (premix) 25 mcg/kg/min Intravenous Rate/Dose Change   01/20/2019 2243 propofol (DIPRIVAN) 1000 mg in 100 mL infusion (premix) 20 mcg/kg/min Intravenous New Bag   01/20/2019 2229 succinylcholine (ANECTINE) 20 mg/mL injection 200 mg 200 mg Intravenous Given   01/20/2019 2230 vecuronium (NORCURON) injection 10 mg 10 mg Intravenous Given   01/21/2019 0121 fentaNYL 1250 mcg in sodium chloride 0 9% 125mL drip 150 mcg/hr Intravenous New Bag   01/21/2019 0045 fentanyl citrate (PF) 100 MCG/2ML 200 mcg 200 mcg Intravenous Given   01/21/2019 0206 propofol (DIPRIVAN) 200 MG/20ML bolus injection **ADS Override Pull** 80 mg Intravenous Given        Past Medical/Surgical History:    Active Ambulatory Problems     Diagnosis Date Noted    No Active Ambulatory Problems     Resolved Ambulatory Problems     Diagnosis Date Noted    No Resolved Ambulatory Problems     Past Medical History:   Diagnosis Date    Asthma      Admitting Diagnosis: Drug overdose [T50 901A]  Altered mental state [R41 82]  Aggressive behavior [R46 89]  Accidental overdose [T50 901A]     Age/Sex: 34 y o  male     Assessment/Plan:   Acute encephalopathy secondary to intoxication/substance abuse  Acute hypoxic respiratory failure secondary to sedation  Right lower lobe consolidation  Bradycardia-medication induced  Agitated/violent behavior  Dehydration/intravascular volume depletion  Metabolic acidosis  Acute kidney injury  Hypokalemia  Polysubstance abuse     Plan:               Neuro:   Patient was initiated on propofol and fentanyl in the emergency department for sedation while on the ventilator  Precedex will be added to sedation regimen  Titrate sedation to achieve RASS -1                  CV:   Patient has bradycardia likely related to sedation  Continue to monitor on telemetry  Patient maintaining appropriate blood pressure                  Pulm:   Maintain on ventilator, patient is not appropriate for spontaneous breathing trial at this time  Patient becomes very agitated when weaned off sedation  Plan to reassess appropriateness for spontaneous breathing trial in a few hours  Patient did have episodes of hypoxia while on the ventilator in the emergency department when he woke up  There is concern he may have had an aspiration event  Right lower lobe consolidation apparent on chest x-ray  Bronchoscopy performed no significant amount of secretions appreciated  Continue with aggressive pulmonary hygiene and peep of 8  ABG pending  Chest x-ray this morning                    GI:   Start tube feeds if patient is not appropriate for spontaneous breathing trial later this morning  GI prophylaxis not indicated at this time                  :  No acute issues, discontinue urinary catheter if patient is weaning from ventilator later this a bere Ray Pronancy                F/E/N:  Patient has acute kidney injury likely secondary to volume depletion  Continue with IV fluids  Recheck laboratory data this morning  Replete potassium  Patient also has metabolic acidosis which may be related to substance abuse and agitation  Continue to monitor labs for improvement                  ID:  There is a concern patient had an aspiration event leading to pneumonitis, antibiotics have not been initiated at this time as hypoxia likely related to inflammatory process    Monitor for signs and symptoms of infection                      Msk/Skin:  Frequent turning and repositioning  Patient had x-ray is completed of left and right hands in the emergency department  Initial rate no acute fractures identified  Awaiting final read on x-rays                  Disposition:  Continue with ICU care  VTE Pharmacologic Prophylaxis: Enoxaparin (Lovenox)  VTE Mechanical Prophylaxis: sequential compression device     Invasive lines and devices:    ETT  3 peripheral IV lines  NGT   [place]     Code Status: No Order  Given critical illness, patient length of stay will require greater than two midnights  Admission Orders:  Scheduled Meds:   Current Facility-Administered Medications:  chlorhexidine 15 mL Swish & Spit Q12H [facility]    dexmedetomidine 0 1-0 7 mcg/kg/hr Intravenous Titrated Last Rate: 0 5 mcg/kg/hr (01/21/19 0819)   enoxaparin 40 mg Subcutaneous Daily    fentaNYL 150 mcg/hr Intravenous Continuous Last Rate: 150 mcg/hr (01/21/19 0121)   multi-electrolyte 125 mL/hr Intravenous Continuous Last Rate: 125 mL/hr (01/21/19 0356)   propofol 5-50 mcg/kg/min Intravenous Titrated Last Rate: 50 mcg/kg/min (01/21/19 2917)     Continuous Infusions:   dexmedetomidine 0 1-0 7 mcg/kg/hr Last Rate: 0 5 mcg/kg/hr (01/21/19 0819)   fentaNYL 150 mcg/hr Last Rate: 150 mcg/hr (01/21/19 0121)   multi-electrolyte 125 mL/hr Last Rate: 125 mL/hr (01/21/19 0356)   propofol 5-50 mcg/kg/min Last Rate: 50 mcg/kg/min (01/21/19 6168)     MICU critical care  Mechanical ventilation  OGT   Daily awakening trial   UP w/ assist   Daily wt  Neuro checks q 2 hr   SCDs  Sputum cultures  Diet NPO       [address] Utilization Review Department  Phone: [phone]; Fax [phone]  [email or web address]  ATTENTION: Please call with any questions or concerns to [phone]  and carefully listen to the prompts so that you are directed to the right person     Send all requests for admission clinical reviews, approved or denied determinations and any other requests to fax [phone]   All voicemails are confidential

## 2019-01-21 NOTE — SOCIAL WORK
CM met with pt at bedside and introduced self/role with dcp  Pt not interested in answering CM questions  Pt ambulating in the room and independent  Pt reports he does use [facility] on [address]      Pt aware [facility] intake phone number will be on pt d/c instructions for f/u  CM also discussed HOST program which pt is currently refusing  CM to follow  CM reviewed d/c planning process including the following: identifying help at home, patient preference for d/c planning needs, Discharge Lounge, [facility] Meds to Bed program, availability of treatment team to discuss questions or concerns patient and/or family may have regarding understanding medications and recognizing signs and symptoms once discharged  CM also encouraged patient to follow up with all recommended appointments after discharge  Patient advised of importance for patient and family to participate in managing patients medical well being

## 2019-01-21 NOTE — RESPIRATORY THERAPY NOTE
RT Ventilator Management Note  Patrick Santos 34 y o  male MRN: [id number]  Unit/Bed#: [facility] 03 Encounter: [id number]      Daily Screen       1/21/2019 0713             Spont breathing trial outcome[de-identified] -            Physical Exam:   Assessment Type: Assess only  General Appearance: Sedated  Respiratory Pattern: Assisted  Chest Assessment: Chest expansion symmetrical  Bilateral Breath Sounds: Diminished  R Breath Sounds: Diminished  L Breath Sounds: Diminished  Cough: None  Suction: ET Tube  O2 Device: vent  Subjective Data: Pt is intubated      Resp Comments: pt cont to xiao PC vent settings no distress noted I have been able to decrease FiO2 to 40% and Peep to 5 pt xiao this well no other changes at this time will cont to monitor

## 2019-01-21 NOTE — RESPIRATORY THERAPY NOTE
RT Ventilator Management Note  Robert Taylor 34 y o  male MRN: [id number]  Unit/Bed#: [facility] 03 Encounter: [id number]      Daily Screen     No data found  Physical Exam:   Assessment Type: (P) Assess only  General Appearance: (P) Sedated  Respiratory Pattern: (P) Assisted  Chest Assessment: (P) Chest expansion symmetrical  Bilateral Breath Sounds: (P) Diminished  R Breath Sounds: (P) Diminished  L Breath Sounds: (P) Diminished  Cough: (P) None  Suction: (P) ET Tube  O2 Device: (P) ventilator  Subjective Data: Pt is intubated      Resp Comments: (P) Pt transferred from ER and placed on AC/PC settings  Pressure control decreased to 18 and PEEP increased to 8  Will continue to titrate fio2  Tolerating current vent settings and appears to be resting comfortably  Will continue to monitor and assess per respiratory protocol

## 2019-01-21 NOTE — PROGRESS NOTES
IMR Unit Transfer Note  Unit/Bed # @DBLINK (GWENDOLYN,91814)@ Encounter: [id number]  SOD Team A          Patrick Santos 34 y o  male [id number]      [address] Problems: Principal Problem:    Acute respiratory failure with hypoxia ([facility] [address] )  Active Problems:    Accidental overdose    Altered mental state    Aggressive behavior    Acute kidney injury ([facility] [address] )    Metabolic acidosis    Hypokalemia    Polysubstance abuse ([facility] [address] )    Assessment/Plan:  1) Polysubstance overdose - Pt successfully extubated and remains calm and cooperative   No episodes of agression or delirium since extubation and pt out of restraints and off of one-to-one    - UDS positive for cocaine, PCP, and THC   - Vital signs stable, afebrile, HR 92, , O2 sat 96 on RA, tachypneic at 36   - Pt uninterested in referrals for drug rehab at this time   - Patient without insurance so psychiatry recommends referral for [facility] intake   - Monitor for signs of withdrawal     2) Suspected aspiration event - Pt c/o some right sided chest pain when he coughs and pleurisy    - WBC 7 6, PCT WNL   - Restarted home albuterol   - CXR in 1/21 AM demonstrating residual right basilar infiltrate   - Pending repeat CXR and blood cultures   - Pending bronch cultures, stain growing 2+ polys and 2+ GPCs in pairs and chains   - Hold abx at this time   - Pain regimen: Tylenol 975 Q8h for mild pain, tramadol 50mg Q6h moderate pain, oxycodone 5mg Q6h severe pain    3) Corneal abrasion - Complains of pain and foreign body sensation since extubation   - Seen by ophthalmology and started on erythromycin ointment Q2h to right eye   - F/u outpatient opthalmology     4) Tobacco abuse   - Continue nicotine patch    VTE Pharmacologic Prophylaxis: Reason for no pharmacologic prophylaxis low VTE score  VTE Mechanical Prophylaxis: sequential compression device    Disposition: Patient requires Med/Surg    Hospital Course: Per Christoph Ring PA-C note dated 1/21/2019 4:13PM:  "Per   Deisi, "Patrick Santos is a 34 y  o  male who presents to the emergency department by ambulance, patient was reported to be dropped off by his friend at home and was extremely agitated and fighting with imaginary people  Amalia Graham was also fighting with renal people and stationary objects as per ED report  Candelarianiki Michela was report patient may have smoked wet   Patient received a total of 800 of ketamine IM as well as Versed 5 mg IM by EMS and was still agitated   Patient arrived handcuffed to Maureen Georges was initially calm and then became violent again   Endotracheal intubation was performed as patient was harmful to himself and staff and he was not able to be cared for appropriately      While in the emergency department patient had episodes of desaturation when he wakes up  Shannon Pires becomes very agitated when sedation is held, he moves all extremities and attempts to sit up in bed and will flail his extremities   Chest x-ray with right lower lobe consolidation likely from aspiration   Urinary drug screen was positive for PCP, marijuana and cocaine "     Bronchoscopy performed early in AM due to concern for aspiration and episodes of hypoxia (required vecuronium for bronch)  Preliminary results reveal 2+ gram positive cocci in pairs and chain  Pt was taken off sedative medications (precedex 0 4, fentanyl 150, propofol 50) around 9am and was extubated around 9:15 AM to NC without any complications  Maintained O2 sats in the high 90s on RA  Patient remained calm and cooperative without any episodes of agitation  Later in the day, patient began to complain of right eye pain  Initial fluorescein exam was unimpressive  Pt continued to c/o severe right eye pain and ophthalmology was consulted  Opthalmology examination revealed corneal abrasion on right eye   Began erythromycin ophthalmic ointment "    Subjective:  Patient states that his right chest hurts especially with coughing that is coughing a lot, difficult to eat food due to the pain  Also complains of cough and headache  Denies fevers, chills, night sweats, weight loss, fatigue, dizziness, palpitations, SOB, wheezing, abdominal pain, N/V, diarrhea, blood in the stool, hematuria, dysuria, lower extremity swelling  Objective:   Vitals:    01/21/19 1000 01/21/19 1200 01/21/19 1400 01/21/19 1433   BP: 145/96 133/81 147/96    BP Location: Left arm Left arm Left arm    Pulse: 96 84 92    Resp: 22 (!) 23 (!) 36    Temp:  98 9 °F (37 2 °C)     TempSrc:  Oral     SpO2: 97% 98% 96% 96%   Weight:       Height:         I/O last 24 hours: In: 4946 2 [P O :1020; I V :2926 2; IV Piggyback:1000]  Out: 3325 [Urine:2825; Emesis/NG output:500]    General Appearance:    Alert, cooperative, no distress, appears stated age  Head:    Normocephalic, without obvious abnormality, atraumatic, right eye increased secretions  Neck:   Supple, symmetrical, trachea midline, no adenopathy  Lungs:    Bilateral basilar rales and rhonchi, mildly increased respiratory effort  Heart:    Regular rate and rhythm, S1 and S2 normal, no murmur, rub   or gallop  Abdomen:     Soft, non-tender, bowel sounds active all four quadrants,     no masses, no organomegaly  Skin:   Skin color, texture, turgor normal, no rashes or lesions    Xr Chest Portable    Result Date: 1/21/2019  Impression: Improved right basilar aeration with some residual atelectasis or infiltrate  Workstation performed: [id number]     Xr Chest 1 View Portable    Result Date: 1/21/2019  Impression: Right basilar opacity which may represent atelectasis or infiltrate with possible small right pleural effusion  Follow-up advised  Workstation performed: [id number]     Xr Hand 3+ Views Left    Result Date: 1/21/2019  Impression: No acute osseous abnormality  Workstation performed: [id number]     Xr Hand 3+ Views Right    Result Date: 1/21/2019  Impression: No acute osseous abnormality   Workstation performed: [id number]     Ct Head Without Contrast    Result Date: 1/20/2019  Impression: No acute intracranial abnormality   Workstation performed: [id number]   Lab Results   Component Value Date    WBC 7 59 01/21/2019    HGB 14 0 01/21/2019    HCT 42 5 01/21/2019    MCV 82 01/21/2019     01/21/2019     Lab Results   Component Value Date     07/31/2015    K 3 5 01/21/2019     01/21/2019    CO2 25 01/21/2019    ANIONGAP 7 07/31/2015    BUN 8 01/21/2019    CREATININE 0 86 01/21/2019    GLUCOSE 106 07/31/2015    CALCIUM 8 3 01/21/2019    AST 22 01/20/2019    ALT 51 01/20/2019    ALKPHOS 67 01/20/2019    PROT 8 5 (H) 07/29/2015    BILITOT 0 43 07/29/2015    EGFR 134 01/21/2019         Christine Barrow MD

## 2019-01-21 NOTE — PROGRESS NOTES
Medical team looked into patient rt eye secondary to complaints of severe pain and not being able to see   Patient was quite agitated all morning with this complaint so his eye was numbed with drops and was looked at no abrasion was noted and patient is comfortable at this time

## 2019-01-21 NOTE — CONSULTS
Consultation - [address] Carmita 34 y o  male MRN: [id number]  Unit/Bed#: [facility] 03 Encounter: [id number]      Chief Complaint:  I do not remember what happened    History of Present Illness   Physician Requesting Consult: Rima Davis,   Reason for Consult / Principal Problem:  Drug overdose    Case Painter is a 34 y o  male presents with drug overdose , history:  He was positive for cocaine, PCP, and THC  He states he cannot remember what happened but did not try to hurt himself  Was having visual hallucinations when he arrived to the emergency room and he have some aggressive behavior while in the MICU  He states that he used marijuana and drinks alcohol on occasions  He states that he use cocaine and occasions but at this moment he is not interested in any drug or alcohol rehabilitation  He took about his children, he states that he had not seen them for several years and he missed them  He also was released from senior care on October day he was getting psychotropic medication but after release he had not take any medication  He used to see psychiatrist in the past a therapist   He states that he has a good family support  At this moment patient is calmer and cooperative, denies suicidal thoughts plans or intent, denies any psychotic symptoms  Psychiatric Review Of Systems:  sleep: no  appetite changes: no  weight changes: no  energy/anergy: no  interest/pleasure/anhedonia: no  somatic symptoms: no  anxiety/panic: no  vangie: no  guilty/hopeless: no  self injurious behavior/risky behavior: no    Historical Information   Past Psychiatric History:   He have any patient admission in 2015  Currently in treatment with none    Past Suicide attempts:  Yes  Past Violent behavior:  None  Past Psychiatric medication trial:  Depakote, risperidone    Substance Abuse History:  History of occasional  alcohol use, cocaine on and off, and THC    I have assessed this patient for substance use within the past 12 months     History of IP/OP rehabilitation program:  None  Smoking history:  He smokes few cigarettes a day  Family Psychiatric History:   Denies any    Social History  Education: 11th grade  Learning Disabilities: None  Marital history: single  Living arrangement, social support: He live with his grandmother  Occupational History: unemployed  Functioning Relationships: good support system    Other Pertinent History: No  history, he had been incarcerated several times, he was released October 2018    Traumatic History:   Abuse: Denies any  Other Traumatic Events: None    Past Medical History:   Diagnosis Date    Asthma        Medical Review Of Systems:  Review of Systems - Negative except wheezing , pain in the right eye , all other systems reviewed were negative    Meds/Allergies   current meds:   Current Facility-Administered Medications   Medication Dose Route Frequency    albuterol (2 5 mg/3 mL) 0 083 % inhalation solution **ADS Override Pull**        albuterol inhalation solution 2 5 mg  2 5 mg Nebulization Q4H PRN    dexmedetomidine (PRECEDEX) 200 mcg in sodium chloride 0 9 % 50 mL infusion  0 1-0 7 mcg/kg/hr Intravenous Titrated    enoxaparin (LOVENOX) subcutaneous injection 40 mg  40 mg Subcutaneous Daily    influenza vaccine, quadrivalent (FLULAVAL) IM injection 0 5 mL  0 5 mL Intramuscular Prior to discharge    multi-electrolyte (ISOLYTE-S PH 7 4 equivalent) IV solution  125 mL/hr Intravenous Continuous    polyvinyl alcohol (LIQUIFILM TEARS) 1 4 % ophthalmic solution 2 drop  2 drop Right Eye Q3H PRN     No Known Allergies    Objective   Vital signs in last 24 hours:  Temp:  [96 7 °F (35 9 °C)-98 9 °F (37 2 °C)] 98 9 °F (37 2 °C)  HR:  [] 92  Resp:  [13-36] 36  BP: (102-149)/() 147/96  FiO2 (%):  [40-50] 40      Intake/Output Summary (Last 24 hours) at 01/21/19 1502  Last data filed at 01/21/19 1401   Gross per 24 hour   Intake          4442 06 ml   Output 3325 ml   Net          1117 06 ml       Mental Status Evaluation:  Appearance:  age appropriate, disheveled and tattooed   Behavior:  normal   Speech:  soft   Mood:  sad   Affect:  mood-congruent   Language: naming objects and repeating phrases   Thought Process:  goal directed   Associations: intact associations   Thought Content:  normal   Perceptual Disturbances: None   Risk Potential: He denies suicidal thoughts plans or intent   Sensorium:  person, place, time/date, situation, day of week and month of year   Memory:  recent and remote memory grossly intact   Cognition:  grossly intact   Consciousness:  alert and awake    Attention: attention span and concentration were age appropriate   Intellect: within normal limits   Fund of Knowledge: awareness of current events: Fair, past history: Fair and vocabulary: Fair   Insight:  fair   Judgment: fair   Muscle Strength and Tone: Within normal limits   Gait/Station: normal gait/station and normal balance   Motor Activity: no abnormal movements     Lab Results:    Lab Results   Component Value Date    WBC 7 59 01/21/2019    HGB 14 0 01/21/2019    HCT 42 5 01/21/2019    MCV 82 01/21/2019     01/21/2019     Lab Results   Component Value Date     07/31/2015    K 3 5 01/21/2019     01/21/2019    CO2 25 01/21/2019    ANIONGAP 7 07/31/2015    BUN 8 01/21/2019    CREATININE 0 86 01/21/2019    GLUCOSE 106 07/31/2015    CALCIUM 8 3 01/21/2019    AST 22 01/20/2019    ALT 51 01/20/2019    ALKPHOS 67 01/20/2019    PROT 8 5 (H) 07/29/2015    BILITOT 0 43 07/29/2015    EGFR 134 01/21/2019         Code Status: )Level 1 - Full Code    Assessment/Plan     Assessment:  Kun Del Cid is a 34 y o  male presented to the hospital with drug overdose, unintentional , he was agitated and had visual hallucination , at this moment patient is more stable and cooperative, he denies any suicidal thoughts plans or intent, he denies any psychotic symptoms    He states that he feels sad because he is not able to see his children for some time  He states that he has family support     Diagnosis:  Polysubstance abuse  Depressive disorder unspecified  Plan:   Continue medical management  Patient is not interested in a referral for drug and alcohol  Referral for [facility] intake, patient does not have any insurance at this moment  No other intervention at this moment  Discussed with primary team  I will sign off  Risks, benefits and possible side effects of Medications:   No medication given      Adilia Watson MD

## 2019-01-21 NOTE — ED NOTES
80mg Propofol bolus given by Dr Castellano in 18G Right AC IV     [facility]  01/21/19 5679

## 2019-01-21 NOTE — H&P
History and Physical - Critical Care   Patrick Santos 34 y o  male MRN: [id number]  [facility] En[facility]: [id number]    Reason for Admission / Chief Complaint:  Altered mental status    History of Present Illness:  Makenna Shine is a 34 y o  male who presents to the emergency department by ambulance, patient was reported to be dropped off by his friend at home and was extremely agitated and fighting with imaginary people  He was also fighting with renal people and stationary objects as per ED report  There was report patient may have smoked wet  Patient received a total of 800 of ketamine IM as well as Versed 5 mg IM by EMS and was still agitated  Patient arrived handcuffed to stretcher  Patient was initially calm and then became violent again  Endotracheal intubation was performed as patient was harmful to himself and staff and he was not able to be cared for appropriately  While in the emergency department patient had episodes of desaturation when he wakes up  Patient becomes very agitated when sedation is held, he moves all extremities and attempts to sit up in bed and will flail his extremities  Chest x-ray with right lower lobe consolidation likely from aspiration  Urinary drug screen was positive for PCP, marijuana and cocaine    History obtained from chart review  Past Medical History:  Past Medical History:   Diagnosis Date    Asthma        Past Surgical History:  No past surgical history on file  Past Family History:  No family history on file      Social History:  History   Smoking Status    Current Every Day Smoker    Types: Cigarettes   Smokeless Tobacco    Not on file     History   Alcohol Use    Yes     Comment: social     History   Drug Use No     Marital Status: Single      Medications:  Current Facility-Administered Medications   Medication Dose Route Frequency    chlorhexidine (PERIDEX) 0 12 % oral rinse 15 mL  15 mL Swish [employer] Spit Q12H [address]    dexmedetomidine (PRECEDEX) 200 mcg in sodium chloride 0 9 % 50 mL infusion  0 1-0 7 mcg/kg/hr Intravenous Titrated    fentaNYL 1250 mcg in sodium chloride 0 9% 125mL drip  150 mcg/hr Intravenous Continuous    Ketamine HCl 100 mg  100 mg Intravenous Once    propofol (DIPRIVAN) 1000 mg in 100 mL infusion (premix)  5-50 mcg/kg/min Intravenous Titrated     Home medications:  Prior to Admission medications    Not on File     Allergies:  No Known Allergies    ROS:    Review of Systems   Unable to perform ROS: Mental status change       Vitals:  Vitals:    19 0017 19 0115 19 0227 19 0239   BP:  102/61     BP Location:  Right arm     Pulse:  76     Resp:  14     Temp:  (!) 96 8 °F (36 °C)     TempSrc:       SpO2: 99% 94% 100%    Weight:    94 2 kg (207 lb 10 8 oz)     Temperature:   Temp (24hrs), Av °F (36 7 °C), Min:96 8 °F (36 °C), Max:98 6 °F (37 °C)    Current Temperature: (!) 96 8 °F (36 °C)    Weights:   IBW: -88 kg  There is no height or weight on file to calculate BMI  Hemodynamic Monitoring:  N/A     Non-Invasive/Invasive Ventilation Settings:  Respiratory    Lab Data (Last 4 hours)    None         O2/Vent Data (Last 4 hours)       0017  022       Vent Mode AC/VC AC/PC AC/PC     Resp Rate (BPM) (BPM) 14 14      Vt (mL) (mL) 500 500      FIO2 (%) (%) 100 100      PEEP (cmH2O) (cmH2O) 5 5      MV 7 5       Resp Rate (BPM) (BPM)  14 14     Pressure Control Set (cm H2O) (cm)  22 18     Insp Time (sec) (sec)  1 1     FiO2 (%) (%)  80 60     PEEP (cmH2O) (cmH2O)  5 8     MV  6 5 7 56               No results found for: PHART, TCM9SWM, PO2ART, OMG9SOW, F0PSEAKW, BEART, SOURCE  SpO2: SpO2: 100 %, SpO2 Activity: SpO2 Activity: At Rest, SpO2 Device: O2 Device: Other (comment) (Tube)     Physical Exam:  Physical Exam   Constitutional: He appears well-developed and well-nourished  HENT:   Head: Normocephalic and atraumatic     Mouth/Throat: Oropharynx is clear and moist    Eyes: Pupils are equal, round, and reactive to light  EOM are normal    Neck: Neck supple  No JVD present  No tracheal deviation present  Cardiovascular: Regular rhythm, normal heart sounds and intact distal pulses  Tachycardia   Pulmonary/Chest: Effort normal and breath sounds normal  No respiratory distress  Abdominal: Soft  Bowel sounds are normal  He exhibits no distension  There is no tenderness  Genitourinary:   Genitourinary Comments: Urinary catheter in place   Musculoskeletal: Normal range of motion  He exhibits no edema or deformity  Neurological:   Off sedation agitated, moves all extremities appears to have 5/5 strength unable to complete full neurologic exam secondary to patient's agitation off sedation and not following commands   Skin: Skin is warm and dry  No rash noted  He is not diaphoretic  Nursing note and vitals reviewed  Labs:    Results from last 7 days  Lab Units 01/20/19  2238   WBC Thousand/uL 7 33   HEMOGLOBIN g/dL 15 9   HEMATOCRIT % 49 2   PLATELETS Thousands/uL 201   NEUTROS PCT % 50   MONOS PCT % 10       Results from last 7 days  Lab Units 01/20/19  2238   SODIUM mmol/L 139   POTASSIUM mmol/L 3 7   CHLORIDE mmol/L 104   CO2 mmol/L 21   ANION GAP mmol/L 14*   BUN mg/dL 7   CREATININE mg/dL 1 16   CALCIUM mg/dL 9 3   ALT U/L 51   AST U/L 22   ALK PHOS U/L 67   ALBUMIN g/dL 4 4   TOTAL BILIRUBIN mg/dL 0 34       Results from last 7 days  Lab Units 01/20/19  2238   MAGNESIUM mg/dL 2 0      Results for Elba Done (MRN [id number]) as of 1/21/2019 02:42   Ref   Range 1/20/2019 22:38 1/20/2019 22:39   MEDICAL ALCOHOL Latest Ref Range: 0 - 3 mg/dL 27 (H)    AMPH/METH Latest Ref Range: Negative   Negative   BARBITURATE URINE Latest Ref Range: Negative   Negative   BENZODIAZEPINE URINE Latest Ref Range: Negative   Negative   THC URINE Latest Ref Range: Negative   Positive (A)   COCAINE URINE Latest Ref Range: Negative   Positive (A)   METHADONE URINE Latest Ref Range: Negative Negative   OPIATE URINE Latest Ref Range: Negative   Negative   PHENCYCLIDINE URINE Latest Ref Range: Negative   Positive (A)                 ABG:No results found for: PHART, HBC0UND, PO2ART, MII9TLM, D3KSWYQP, BEART, SOURCE  VBG:        Imaging: I have personally reviewed pertinent reports  and I have personally reviewed pertinent films in PACS    Chest x-ray 01/20/2019  Right lower lobe consolidation, endotracheal tube in appropriate position    EKG:  Sinus tachycardia on telemetry This was personally reviewed by myself          ______________________________________________________________________    Assessment:     Acute encephalopathy secondary to intoxication/substance abuse  Acute hypoxic respiratory failure secondary to sedation  Right lower lobe consolidation  Bradycardia-medication induced  Agitated/violent behavior  Dehydration/intravascular volume depletion  Metabolic acidosis  Acute kidney injury  Hypokalemia  Polysubstance abuse    Plan:    Neuro:   Patient was initiated on propofol and fentanyl in the emergency department for sedation while on the ventilator  Precedex will be added to sedation regimen  Titrate sedation to achieve RASS -1      CV:   Patient has bradycardia likely related to sedation  Continue to monitor on telemetry  Patient maintaining appropriate blood pressure  Pulm:   Maintain on ventilator, patient is not appropriate for spontaneous breathing trial at this time  Patient becomes very agitated when weaned off sedation  Plan to reassess appropriateness for spontaneous breathing trial in a few hours  Patient did have episodes of hypoxia while on the ventilator in the emergency department when he woke up  There is concern he may have had an aspiration event  Right lower lobe consolidation apparent on chest x-ray  Bronchoscopy performed no significant amount of secretions appreciated  Continue with aggressive pulmonary hygiene and peep of 8  ABG pending    Chest x-ray this morning  GI:   Start tube feeds if patient is not appropriate for spontaneous breathing trial later this morning  GI prophylaxis not indicated at this time  :  No acute issues, discontinue urinary catheter if patient is weaning from ventilator later this a m      F/E/N:  Patient has acute kidney injury likely secondary to volume depletion  Continue with IV fluids  Recheck laboratory data this morning  Replete potassium  Patient also has metabolic acidosis which may be related to substance abuse and agitation  Continue to monitor labs for improvement  ID:  There is a concern patient had an aspiration event leading to pneumonitis, antibiotics have not been initiated at this time as hypoxia likely related to inflammatory process  Monitor for signs and symptoms of infection  Heme:  No acute issues     Endo:  No acute issues     Msk/Skin:  Frequent turning and repositioning  Patient had x-ray is completed of left and right hands in the emergency department  Initial rate no acute fractures identified  Awaiting final read on x-rays  Disposition:  Continue with ICU care  Counseling / Coordination of Care  Total Critical Care time spent 38 minutes excluding procedures, teaching and family updates  ______________________________________________________________________    VTE Pharmacologic Prophylaxis: Enoxaparin (Lovenox)  VTE Mechanical Prophylaxis: sequential compression device    Invasive lines and devices:   Invasive Devices     Peripheral Intravenous Line            Peripheral IV 01/20/19 Left Arm less than 1 day    Peripheral IV 01/20/19 Right Forearm less than 1 day    Peripheral IV 01/20/19 Right Hand less than 1 day          Drain            NG/OG/Enteral Tube Orogastric 18 Fr Right mouth less than 1 day    Urethral Catheter Temperature probe less than 1 day          Airway            ETT  8 mm less than 1 day                Code Status: No Order  POA:    POLST: Given critical illness, patient length of stay will require greater than two midnights  Portions of the record may have been created with voice recognition software  Occasional wrong word or "sound a like" substitutions may have occurred due to the inherent limitations of voice recognition software  Read the chart carefully and recognize, using context, where substitutions have occurred        Florentino Webster DO

## 2019-01-21 NOTE — PROGRESS NOTES
Progress Note - ICU Transfer to SD/MS tele   Silvia Turcios 34 y o  male MRN: [id number]  [address] [facility]   Unit/Bed#: [facility] 08 Encounter: [id number]    Code Status: Level 1 - Full Code  POA:    POLST:      Reason for ICU admission: altered mental status 2/2 drug overdose    Active problems:   Principal Problem:    Acute respiratory failure with hypoxia ([facility] [address] )  Active Problems:    Metabolic acidosis    Altered mental state    Accidental overdose    Acute kidney injury ([facility] [address] )    Aggressive behavior    Hypokalemia    Polysubstance abuse ([facility] [address] )  Resolved Problems:    * No resolved hospital problems  *      Consultants: Ophthalmology, Psychiatry    History of Present Illness: Per Dr Maik Robert, "Silvia Turcios is a 34 y o  male who presents to the emergency department by ambulance, patient was reported to be dropped off by his friend at home and was extremely agitated and fighting with imaginary people  He was also fighting with renal people and stationary objects as per ED report  There was report patient may have smoked wet  Patient received a total of 800 of ketamine IM as well as Versed 5 mg IM by EMS and was still agitated  Patient arrived handcuffed to stretcher  Patient was initially calm and then became violent again  Endotracheal intubation was performed as patient was harmful to himself and staff and he was not able to be cared for appropriately      While in the emergency department patient had episodes of desaturation when he wakes up  Patient becomes very agitated when sedation is held, he moves all extremities and attempts to sit up in bed and will flail his extremities  Chest x-ray with right lower lobe consolidation likely from aspiration  Urinary drug screen was positive for PCP, marijuana and cocaine "    Summary of clinical course: Bronchoscopy performed early in AM due to concern for aspiration and episodes of hypoxia (required vecuronium for bronch)   Preliminary results reveal 2+ gram positive cocci in pairs and chain  Pt was taken off sedative medications (precedex 0 4, fentanyl 150, propofol 50) around 9am and was extubated around 9:15 AM to NC without any complications  Maintained O2 sats in the high 90s on RA  Patient remained calm and cooperative without any episodes of agitation  Later in the day, patient began to complain of right eye pain  Initial fluorescein exam was unimpressive  Pt continued to c/o severe right eye pain and ophthalmology was consulted  Opthalmology examination revealed corneal abrasion on right eye  Began erythromycin ophthalmic ointment  Recent or scheduled procedures: None    Outstanding/pending diagnostics: F/u final bronch cultures- preliminary 2+ gram positive cocci in pairs and chains  F/u tomorrow AM CXR  Cultures:       Results from last 7 days  Lab Units 01/21/19  0404   GRAM STAIN RESULT  2+ Polys  Rare Epithelial cells per low power field  2+ Gram positive cocci in pairs and chains          Mobilization Plan: Pt to ambulate and be OOB TID  Nutrition Plan:          Diet Orders            Start     Ordered    01/21/19 1518  Dietary nutrition supplements  Once     Question Answer Comment   Select Supplement: Ensure Enlive-Strawberry    Frequency Lunch, Dinner        01/21/19 1517    01/21/19 1011  Diet Regular; Regular House  Diet effective now     Question Answer Comment   Diet Type Regular    Regular Regular House    RD to adjust diet per protocol? Yes        01/21/19 1010          Discharge Plan:   Patient should be ready for discharge to home after resolution of medical course  Recommend outpatient drug rehab program, pt not currently interested in referral  Referral for [facility] for psychiatric medications, however pt does not have any insurance at this time  Initial /Plan: Consult placed      Home medications that are not reordered and reason why: none     Specific Diagnosis Plan:    Drug overdose  - Pt successfully extubated and remains calm and cooperative  No episodes of agression since extubation and pt out of restraints and off of one-to-one   - Pt uninterested in referrals for drug rehab at this time  - Appreciate psychiatry recommendations    Possible aspiration event  - Pt c/o some right sided pain when he coughs and some chest tightness  - Restarted home albuterol  - Hold abx at this time  - F/u final bronch cultures  - F/u AM procalcitonin  - F/u CXR in AM  - Follow temps and WBC count    Corneal abrasion  - Erythromycin ophthalmic ointment q6hr  - F/u outpatient opthalmology    Tobacco abuse  - Continue nicotine patch    Spoke with Dr Padilla Amina  regarding transfer  Please call [phone] with any questions or concerns  Portions of the record may have been created with voice recognition software  Occasional wrong word or "sound a like" substitutions may have occurred due to the inherent limitations of voice recognition software  Read the chart carefully and recognize, using context, where substitutions have occurred      Fred Quezada PA-C

## 2019-01-21 NOTE — PROGRESS NOTES
Critical Care Attending Note    Admission events and hospital course reviewed  Intubated for violent behavior, noted +UDS cocaine/PCP/THC  Bronchoscopy overnight for RLL collapse/infiltrate  VS reviewed AF, HR 70's, MAPs 90's, SpO2 100% 0 4/5PEEP  Exam  Sedation held, pt initially somnolent, arouses, followed commands all ext, nonviolent  MMM, no thrush  CV reg, single s1/2, no m/r  Pulm mechanical BS, clear, no wheeze or rales  ABD +BS soft NTND, no rebound  Ext warm, no edema   clark in place, yellow urine    Placed on PSV 5/5 with Vt >1000cc and RR <20    Labs reviewed  7 43/38/141  SCr 0 9, K 3 5, WBC 7 6, hgb 14, plt 169  UDS + Cocaine/THC/PCP    BAL culture pending    CXR - images personally reviewed - elevated right HD with improved infiltrate/volume loss from initial film, ETT at clavicles, OGT below HD, no PTX    CT head - images personally reviewed - no acute mass, bleed, shift    Assessment  1  Acute toxic/metatobolic encephalopathy secondary to substance abuse  2  Acute hypoxic respiratory failure  3  RLL consolidation, possible aspiration  4  Mild JIMMIE/vollume depletion  5  Hypokalemia  6  Bradycardia - secondary to medication effect  7  Poly substance abuse    PLAN  · Holding sedation except precedex  · Trial of extubation now, wean precedex after extubation  · Continuous observation, psych eval once extubated  · D/C Clark  · Continue IVF until taking PO, bedside swallow post extubation  · Check PCT, repeat CXR in AM, follow cultures, hold abx at this time  · LMWH/SCDs   · Replete electrolytes    I have personally seen and examined the patient on 01/21/19  I discussed the patient with the AP/resident including, but not limited to, verifying findings; reviewing labs and x-rays; discussing with consultants; developing the plan of care with the bedside nurse; and discussing treatment plan with patient or surrogate    I have reviewed the note and assessment performed by the AP/resident and agree with the AP/residents documented findings and plan of care with the following additions/exceptions  Please see my following comments for details and adjustments  Critical care time, excluding procedures, teaching, family meetings, and excludes any prior time recorded by the AP/resident, (30) minutes        Maria Esther Elizabeth DO

## 2019-01-21 NOTE — RESPIRATORY THERAPY NOTE
RT Protocol Note  Veronica Mclaughlin 34 y o  male MRN: [id number]  Unit/Bed#: ED 13 Encounter: [id number]    Assessment    Active Problems:    * No active hospital problems  *      Home Pulmonary Medications:  None       Past Medical History:   Diagnosis Date    Asthma      Social History     Social History    Marital status: /Civil Union     Spouse name: N/A    Number of children: N/A    Years of education: N/A     Social History Main Topics    Smoking status: Current Every Day Smoker     Types: Cigarettes    Smokeless tobacco: Not on file    Alcohol use Yes      Comment: social    Drug use: No    Sexual activity: Not on file     Other Topics Concern    Not on file     Social History Narrative    No narrative on file       Subjective    Subjective Data: Pt is intubated    Objective    Physical Exam:   Assessment Type: Assess only  General Appearance: Sedated  Respiratory Pattern: Assisted  Chest Assessment: Chest expansion symmetrical  Bilateral Breath Sounds: Coarse, Expiratory wheezes  O2 Device: Vent    Vitals:  Blood pressure 144/75, pulse 90, resp  rate 14, SpO2 98 %  Imaging and other studies: I have personally reviewed pertinent reports  O2 Device: Vent     Plan    Respiratory Plan: Vent/NIV/HFNC        Resp Comments: (P) Pt intubated at bedside in ED due to not being able to maintain airway  Positive color change and bilateral breath sounds  Pt placed on 840 with [address] settings  Resp protocol also initiated at this time  Per chart pt does not have any pulmonary history  Per chart pt does not take any respiratory meds at home  Will continue to monitor under protocol

## 2019-01-22 ENCOUNTER — APPOINTMENT (INPATIENT)
Dept: RADIOLOGY | Facility: HOSPITAL | Age: 34
DRG: 917 | End: 2019-01-22

## 2019-01-22 VITALS
HEIGHT: 70 IN | OXYGEN SATURATION: 94 % | HEART RATE: 85 BPM | TEMPERATURE: 99.1 F | SYSTOLIC BLOOD PRESSURE: 134 MMHG | WEIGHT: 215.17 LBS | RESPIRATION RATE: 20 BRPM | BODY MASS INDEX: 30.8 KG/M2 | DIASTOLIC BLOOD PRESSURE: 76 MMHG

## 2019-01-22 PROBLEM — E87.20 METABOLIC ACIDOSIS: Status: RESOLVED | Noted: 2019-01-21 | Resolved: 2019-01-22

## 2019-01-22 PROBLEM — T50.901A ACCIDENTAL OVERDOSE: Status: RESOLVED | Noted: 2019-01-21 | Resolved: 2019-01-22

## 2019-01-22 PROBLEM — R41.82 ALTERED MENTAL STATE: Status: RESOLVED | Noted: 2019-01-21 | Resolved: 2019-01-22

## 2019-01-22 PROBLEM — E87.2 METABOLIC ACIDOSIS: Status: RESOLVED | Noted: 2019-01-21 | Resolved: 2019-01-22

## 2019-01-22 PROBLEM — S05.01XA CORNEAL ABRASION, RIGHT: Status: ACTIVE | Noted: 2019-01-22

## 2019-01-22 PROBLEM — N17.9 ACUTE KIDNEY INJURY (HCC): Status: RESOLVED | Noted: 2019-01-21 | Resolved: 2019-01-22

## 2019-01-22 PROBLEM — J96.01 ACUTE RESPIRATORY FAILURE WITH HYPOXIA (HCC): Status: RESOLVED | Noted: 2019-01-21 | Resolved: 2019-01-22

## 2019-01-22 PROBLEM — R46.89 AGGRESSIVE BEHAVIOR: Status: RESOLVED | Noted: 2019-01-21 | Resolved: 2019-01-22

## 2019-01-22 LAB
ANION GAP SERPL CALCULATED.3IONS-SCNC: 10 MMOL/L (ref 4–13)
BASOPHILS # BLD AUTO: 0.03 THOUSANDS/ΜL (ref 0–0.1)
BASOPHILS NFR BLD AUTO: 1 % (ref 0–1)
BUN SERPL-MCNC: 5 MG/DL (ref 5–25)
CALCIUM SERPL-MCNC: 8.7 MG/DL (ref 8.3–10.1)
CHLORIDE SERPL-SCNC: 103 MMOL/L (ref 100–108)
CO2 SERPL-SCNC: 25 MMOL/L (ref 21–32)
CREAT SERPL-MCNC: 1.04 MG/DL (ref 0.6–1.3)
EOSINOPHIL # BLD AUTO: 0.26 THOUSAND/ΜL (ref 0–0.61)
EOSINOPHIL NFR BLD AUTO: 4 % (ref 0–6)
ERYTHROCYTE [DISTWIDTH] IN BLOOD BY AUTOMATED COUNT: 14.3 % (ref 11.6–15.1)
GFR SERPL CREATININE-BSD FRML MDRD: 110 ML/MIN/1.73SQ M
GLUCOSE SERPL-MCNC: 93 MG/DL (ref 65–140)
HCT VFR BLD AUTO: 44.2 % (ref 36.5–49.3)
HGB BLD-MCNC: 14.4 G/DL (ref 12–17)
IMM GRANULOCYTES # BLD AUTO: 0.02 THOUSAND/UL (ref 0–0.2)
IMM GRANULOCYTES NFR BLD AUTO: 0 % (ref 0–2)
LACTATE SERPL-SCNC: 1.3 MMOL/L (ref 0.5–2)
LYMPHOCYTES # BLD AUTO: 1.08 THOUSANDS/ΜL (ref 0.6–4.47)
LYMPHOCYTES NFR BLD AUTO: 16 % (ref 14–44)
MAGNESIUM SERPL-MCNC: 2 MG/DL (ref 1.6–2.6)
MCH RBC QN AUTO: 26.9 PG (ref 26.8–34.3)
MCHC RBC AUTO-ENTMCNC: 32.6 G/DL (ref 31.4–37.4)
MCV RBC AUTO: 83 FL (ref 82–98)
MONOCYTES # BLD AUTO: 0.46 THOUSAND/ΜL (ref 0.17–1.22)
MONOCYTES NFR BLD AUTO: 7 % (ref 4–12)
NEUTROPHILS # BLD AUTO: 4.77 THOUSANDS/ΜL (ref 1.85–7.62)
NEUTS SEG NFR BLD AUTO: 72 % (ref 43–75)
NRBC BLD AUTO-RTO: 0 /100 WBCS
PLATELET # BLD AUTO: 153 THOUSANDS/UL (ref 149–390)
PMV BLD AUTO: 12.6 FL (ref 8.9–12.7)
POTASSIUM SERPL-SCNC: 3.1 MMOL/L (ref 3.5–5.3)
PROCALCITONIN SERPL-MCNC: <0.05 NG/ML
RBC # BLD AUTO: 5.35 MILLION/UL (ref 3.88–5.62)
SODIUM SERPL-SCNC: 138 MMOL/L (ref 136–145)
WBC # BLD AUTO: 6.62 THOUSAND/UL (ref 4.31–10.16)

## 2019-01-22 PROCEDURE — 83735 ASSAY OF MAGNESIUM: CPT | Performed by: PHYSICIAN ASSISTANT

## 2019-01-22 PROCEDURE — 83605 ASSAY OF LACTIC ACID: CPT | Performed by: INTERNAL MEDICINE

## 2019-01-22 PROCEDURE — 84145 PROCALCITONIN (PCT): CPT | Performed by: INTERNAL MEDICINE

## 2019-01-22 PROCEDURE — 85025 COMPLETE CBC W/AUTO DIFF WBC: CPT | Performed by: PHYSICIAN ASSISTANT

## 2019-01-22 PROCEDURE — 71046 X-RAY EXAM CHEST 2 VIEWS: CPT

## 2019-01-22 PROCEDURE — 80048 BASIC METABOLIC PNL TOTAL CA: CPT | Performed by: PHYSICIAN ASSISTANT

## 2019-01-22 PROCEDURE — 99232 SBSQ HOSP IP/OBS MODERATE 35: CPT | Performed by: INTERNAL MEDICINE

## 2019-01-22 RX ORDER — POLYVINYL ALCOHOL 14 MG/ML
2 SOLUTION/ DROPS OPHTHALMIC
Qty: 15 ML | Refills: 0 | Status: SHIPPED | OUTPATIENT
Start: 2019-01-22

## 2019-01-22 RX ORDER — ERYTHROMYCIN 5 MG/G
0.5 OINTMENT OPHTHALMIC
Qty: 3.5 G | Refills: 0 | Status: SHIPPED | OUTPATIENT
Start: 2019-01-22 | End: 2019-01-27

## 2019-01-22 RX ORDER — MAGNESIUM SULFATE 1 G/100ML
1 INJECTION INTRAVENOUS ONCE
Status: COMPLETED | OUTPATIENT
Start: 2019-01-22 | End: 2019-01-22

## 2019-01-22 RX ORDER — POTASSIUM CHLORIDE 14.9 MG/ML
20 INJECTION INTRAVENOUS
Status: DISPENSED | OUTPATIENT
Start: 2019-01-22 | End: 2019-01-22

## 2019-01-22 RX ORDER — POTASSIUM CHLORIDE 20 MEQ/1
40 TABLET, EXTENDED RELEASE ORAL ONCE
Status: COMPLETED | OUTPATIENT
Start: 2019-01-22 | End: 2019-01-22

## 2019-01-22 RX ADMIN — ERYTHROMYCIN 0.5 INCH: 5 OINTMENT OPHTHALMIC at 02:37

## 2019-01-22 RX ADMIN — ERYTHROMYCIN 0.5 INCH: 5 OINTMENT OPHTHALMIC at 06:22

## 2019-01-22 RX ADMIN — POTASSIUM CHLORIDE 20 MEQ: 200 INJECTION, SOLUTION INTRAVENOUS at 07:40

## 2019-01-22 RX ADMIN — ERYTHROMYCIN 0.5 INCH: 5 OINTMENT OPHTHALMIC at 00:05

## 2019-01-22 RX ADMIN — MAGNESIUM SULFATE HEPTAHYDRATE 1 G: 1 INJECTION, SOLUTION INTRAVENOUS at 11:07

## 2019-01-22 RX ADMIN — NICOTINE 14 MG: 14 PATCH TRANSDERMAL at 08:45

## 2019-01-22 RX ADMIN — ERYTHROMYCIN 0.5 INCH: 5 OINTMENT OPHTHALMIC at 04:46

## 2019-01-22 RX ADMIN — ACETAMINOPHEN 975 MG: 325 TABLET ORAL at 04:46

## 2019-01-22 RX ADMIN — ERYTHROMYCIN 0.5 INCH: 5 OINTMENT OPHTHALMIC at 11:54

## 2019-01-22 RX ADMIN — POTASSIUM CHLORIDE 40 MEQ: 1500 TABLET, EXTENDED RELEASE ORAL at 11:54

## 2019-01-22 RX ADMIN — ERYTHROMYCIN 0.5 INCH: 5 OINTMENT OPHTHALMIC at 07:35

## 2019-01-22 RX ADMIN — ENOXAPARIN SODIUM 40 MG: 40 INJECTION SUBCUTANEOUS at 08:46

## 2019-01-22 RX ADMIN — TRAMADOL HYDROCHLORIDE 50 MG: 50 TABLET, COATED ORAL at 06:26

## 2019-01-22 RX ADMIN — ERYTHROMYCIN 0.5 INCH: 5 OINTMENT OPHTHALMIC at 11:11

## 2019-01-22 NOTE — PLAN OF CARE
GENITOURINARY - ADULT     Maintains or returns to baseline urinary function Completed     Absence of urinary retention Completed     Urinary catheter remains patent Completed

## 2019-01-22 NOTE — PROGRESS NOTES
Progress Note - Shelia Wilson 34 y o  male MRN: [id number]    [facility] Encounter: [id number]    SOD A, Hospital Days - 2     Assessment/Plan:  1) Polysubstance overdose - Pt successfully extubated and remains calm and cooperative  No episodes of agression or delirium since extubation and pt out of restraints and off of one-to-one               - UDS positive for cocaine, PCP, and THC              - AVSS, afebrile, HR 85, , O2 sat 94 on RA, RR 20              - Pt uninterested in referrals for drug rehab at this time              - Patient without insurance so psychiatry recommends referral for [facility] intake              - Monitor for signs of withdrawal     2) Suspected aspiration event - Pt c/o some right sided chest pain when he coughs and pleurisy               - WBC 7 6, PCT WNL on 1/21, pending repeat              - Restarted home albuterol              - CXR in 1/21 AM demonstrating residual right basilar infiltrate              - Pending repeat CXR              - Pending bronch cultures, stain growing 2+ polys and 2+ GPCs in pairs and chains              - Hold abx at this time              - Pain regimen: Tylenol 975 Q8h for mild pain, tramadol 50mg Q6h moderate pain, oxycodone 5mg Q6h severe pain     3) Corneal abrasion - Complains of pain and foreign body sensation since extubation              - Seen by ophthalmology and started on erythromycin ointment Q2h to right eye              - F/u outpatient opthalmology     4) Tobacco abuse              - Continue nicotine patch    Dispo - Pending repeat CXR to evaluate possible pneumonitis/PNA    Subjective:   Patient resting comfortably in bed, states right-sided chest pain unchanged, still coughing and has pleuritic chest pain  Denies fevers, chills, head aches, dizziness, palpitations, SOB, wheezing, abdominal pain, N/V, diarrhea, blood in the stool, hematuria, dysuria, lower extremity swelling      Objective:     Vitals: Blood pressure 134/76, pulse 85, temperature 99 1 °F (37 3 °C), temperature source Oral, resp  rate 20, height 5' 10" (1 778 m), weight 97 6 kg (215 lb 2 7 oz), SpO2 94 %  ,Body mass index is 30 87 kg/m²  Intake/Output Summary (Last 24 hours) at 01/22/19 0751  Last data filed at 01/22/19 0546   Gross per 24 hour   Intake          2651 79 ml   Output              975 ml   Net          1676 79 ml     General Appearance:    Alert, cooperative, no distress, appears stated age  Head:    Normocephalic, without obvious abnormality, atraumatic  Neck:   Supple, symmetrical, trachea midline, no adenopathy  Lungs:    Decreased bilateral lung sounds bases, rhonchi in left middle and right middle lung fields  Heart:    Regular rate and rhythm, S1 and S2 normal, no murmur, rub   or gallop  Abdomen:     Soft, non-tender, bowel sounds active all four quadrants,     no masses, no organomegaly  Skin:   Skin color, texture, turgor normal, no rashes or lesions      Invasive Devices     Peripheral Intravenous Line            Peripheral IV 01/20/19 Left Arm 1 day    Peripheral IV 01/20/19 Right Hand 1 day                Lab, Imaging and other studies: I have personally reviewed pertinent films in PACS   Lab Results   Component Value Date    WBC 6 62 01/22/2019    HGB 14 4 01/22/2019    HCT 44 2 01/22/2019    MCV 83 01/22/2019     01/22/2019     Lab Results   Component Value Date     07/31/2015    K 3 1 (L) 01/22/2019     01/22/2019    CO2 25 01/22/2019    ANIONGAP 7 07/31/2015    BUN 5 01/22/2019    CREATININE 1 04 01/22/2019    GLUCOSE 106 07/31/2015    CALCIUM 8 7 01/22/2019    AST 22 01/20/2019    ALT 51 01/20/2019    ALKPHOS 67 01/20/2019    PROT 8 5 (H) 07/29/2015    BILITOT 0 43 07/29/2015    EGFR 110 01/22/2019     Xr Chest Portable    Result Date: 1/21/2019  Impression: Improved right basilar aeration with some residual atelectasis or infiltrate   Workstation performed: [id number]     Xr Chest 1 View Portable    Result Date: 1/21/2019  Impression: Right basilar opacity which may represent atelectasis or infiltrate with possible small right pleural effusion  Follow-up advised  Workstation performed: [id number]     Xr Hand 3+ Views Left    Result Date: 1/21/2019  Impression: No acute osseous abnormality  Workstation performed: [id number]     Xr Hand 3+ Views Right    Result Date: 1/21/2019  Impression: No acute osseous abnormality  Workstation performed: [id number]     Ct Head Without Contrast    Result Date: 1/20/2019  Impression: No acute intracranial abnormality   Workstation performed: [id number]     VTE Pharmacologic Prophylaxis: Reason for no pharmacologic prophylaxis low VTE score  VTE Mechanical Prophylaxis: sequential compression device

## 2019-01-22 NOTE — PLAN OF CARE
CARDIOVASCULAR - ADULT     Maintains optimal cardiac output and hemodynamic stability Progressing     Absence of cardiac dysrhythmias or at baseline rhythm Progressing        DISCHARGE PLANNING     Discharge to home or other facility with appropriate resources Progressing        DISCHARGE PLANNING - CARE MANAGEMENT     Discharge to post-acute care or home with appropriate resources Progressing        GENITOURINARY - ADULT     Maintains or returns to baseline urinary function Progressing     Absence of urinary retention Progressing     Urinary catheter remains patent Progressing        INFECTION - ADULT     Absence or prevention of progression during hospitalization Progressing     Absence of fever/infection during neutropenic period Progressing        Knowledge Deficit     Patient/family/caregiver demonstrates understanding of disease process, treatment plan, medications, and discharge instructions Progressing        METABOLIC, FLUID AND ELECTROLYTES - ADULT     Electrolytes maintained within normal limits Progressing        NEUROSENSORY - ADULT     Absence of seizures Progressing     Achieves maximal functionality and self care Progressing        Nutrition/Hydration-ADULT     Nutrient/Hydration intake appropriate for improving, restoring or maintaining nutritional needs Progressing        PAIN - ADULT     Verbalizes/displays adequate comfort level or baseline comfort level Progressing        Potential for Falls     Patient will remain free of falls Progressing        Prexisting or High Potential for Compromised Skin Integrity     Skin integrity is maintained or improved Progressing        RESPIRATORY - ADULT     Achieves optimal ventilation and oxygenation Progressing        SAFETY ADULT     Maintain or return to baseline ADL function Progressing     Maintain or return mobility status to optimal level Progressing        SKIN/TISSUE INTEGRITY - ADULT     Skin integrity remains intact Progressing  Oral mucous membranes remain intact Progressing        SUBSTANCE USE/ABUSE     Will have no detox symptoms and will verbalize plan for changing substance-related behavior Progressing     By discharge, will develop insight into their chemical dependency and sustain motivation to continue in recovery Progressing     By discharge, patient will have ongoing treatment plan addressing chemical dependency Progressing

## 2019-01-22 NOTE — PLAN OF CARE
CARDIOVASCULAR - ADULT     Maintains optimal cardiac output and hemodynamic stability Completed     Absence of cardiac dysrhythmias or at baseline rhythm Completed        DISCHARGE PLANNING     Discharge to home or other facility with appropriate resources Completed        DISCHARGE PLANNING - CARE MANAGEMENT     Discharge to post-acute care or home with appropriate resources Completed        INFECTION - ADULT     Absence or prevention of progression during hospitalization Completed     Absence of fever/infection during neutropenic period Completed        Knowledge Deficit     Patient/family/caregiver demonstrates understanding of disease process, treatment plan, medications, and discharge instructions Completed        METABOLIC, FLUID AND ELECTROLYTES - ADULT     Electrolytes maintained within normal limits Completed        NEUROSENSORY - ADULT     Absence of seizures Completed     Achieves maximal functionality and self care Completed        Nutrition/Hydration-ADULT     Nutrient/Hydration intake appropriate for improving, restoring or maintaining nutritional needs Completed        PAIN - ADULT     Verbalizes/displays adequate comfort level or baseline comfort level Completed        Potential for Falls     Patient will remain free of falls Completed        Prexisting or High Potential for Compromised Skin Integrity     Skin integrity is maintained or improved Completed        RESPIRATORY - ADULT     Achieves optimal ventilation and oxygenation Completed        SAFETY ADULT     Maintain or return to baseline ADL function Completed     Maintain or return mobility status to optimal level Completed        SKIN/TISSUE INTEGRITY - ADULT     Skin integrity remains intact Completed     Oral mucous membranes remain intact Completed        SUBSTANCE USE/ABUSE     Will have no detox symptoms and will verbalize plan for changing substance-related behavior Completed     By discharge, will develop insight into their chemical dependency and sustain motivation to continue in recovery Completed     By discharge, patient will have ongoing treatment plan addressing chemical dependency Completed

## 2019-01-22 NOTE — RESTORATIVE TECHNICIAN NOTE
Restorative Specialist Mobility Note       Activity: Other (Comment) (Educated/encouraged pt to ambulate with assistance 3-4 x's/day, pt refused stating he can ambulate Independently GUERLINE mancilla   Pt callbell, phone/tray within reach )       Minna TORRES, Restorative Technician, [employer] [facility]

## 2019-01-22 NOTE — DISCHARGE INSTRUCTIONS
Polysubstance Abuse   WHAT YOU NEED TO KNOW:   Polysubstance abuse is the abuse of 2 or more drugs that cause impairment or distress  Examples include alcohol, nicotine, marijuana, cocaine, heroin, methamphetamine, hallucinogens such as mushrooms, or inhalants such as paint thinner  Prescribed medicines, such as opioids for pain or benzodiazepines for anxiety, are also commonly abused  DISCHARGE INSTRUCTIONS:   Call 911 for any of the following:   · You feel you might harm yourself or others  Return to the emergency department if:   · You have a seizure  · You have chest pain and your heart is beating faster than usual      · You have new shortness of breath  · You are dizzy and lightheaded  Contact your healthcare provider or therapist if:   · You are using drugs and think you are pregnant  · You have withdrawal symptoms and want to start using drugs again  · You have questions or concerns about your condition or care  Risks of polysubstance abuse:   · Drug dependence  is when you continue to use drugs, even when you know the risks  Polysubstance abuse can damage your heart, brain, lungs, liver, and gastrointestinal tract  You continue even when it causes problems with work, school, or relationships  You may have difficulty finding or keeping a job because of your drug dependence  · Drug tolerance  is when you need to use more drugs, or use them more often, to get the effects you want  You may not be able to stop using the drugs  When you try to stop, you may have withdrawal symptoms and strong cravings for the drugs  · Drug overdose  can occur when you take more drugs than your body can handle  This may be a small amount or a large amount  You can lose consciousness or have a seizure or stroke  Your heart can stop beating, or you can stop breathing  You may die from a drug overdose  Medicines:   · Withdrawal medicines  may be given according to the types of drugs you are abusing  Withdrawal from drugs can cause serious, life-threatening side effects  Certain medicines can help decrease your withdrawal symptoms and your desire for the drug  Ask for more information about the withdrawal medicines you may need  · Mood stabilizers  may be given to help prevent or treat depression or anxiety caused by drug abuse and withdrawal      · Take your medicine as directed  Contact your healthcare provider if you think your medicine is not helping or if you have side effects  Tell him or her if you are allergic to any medicine  Keep a list of the medicines, vitamins, and herbs you take  Include the amounts, and when and why you take them  Bring the list or the pill bottles to follow-up visits  Carry your medicine list with you in case of an emergency  Follow up with your healthcare provider as directed: You may be referred to a specialist to treat health conditions caused by your drug use  This includes mental health, heart, or lung specialists  Write down your questions so you remember to ask them during your visits  Therapy:  You may need therapy and support to stop using drugs:  · Cognitive and behavioral therapy  helps you change your thinking and behavior  It can help you develop plans to avoid the situations that make you want to use drugs  It also helps you cope with the feelings of wanting to use drugs  You may have individual or group therapy  · Contingency management  helps you set drug-free goals with a therapist  Austin Richter will decide ways to celebrate your success when you reach a goal      · Family therapy and support groups  allow you and your family members to talk to and be encouraged by other people affected by drug abuse  You and your family members may attend together or separately  Ask your healthcare provider for information about programs in your area    How polysubstance abuse affects unborn or  babies:   · If you are pregnant or get pregnant while using drugs, you may have a miscarriage or give birth early  Your baby may be born addicted to the drugs  · Do not breastfeed your baby if you use drugs  Drugs pass from your bloodstream into your breast milk and affect your baby's health  Talk with your healthcare provider if you are using drugs and breastfeeding  For support and more information:   · Alcoholics Anonymous  Web Address: http://[employer]/  · AD[employer] Francois on Drug and Alcohol Information  Phone: 7- 262 - 5628020  Web Address: "[employer]  · [employer] on Alcoholism and Drug Dependence  e[employer]ck[employer] Davina Kuos[employer]ster[employer] [address] [place] [address]  Phone: 9- 116 - 983-2668  Phone: 4- 048 - [phone]  Web Address: [employer]  org  © 2017 [address] Information is for End User's use only and may not be sold, redistributed or otherwise used for commercial purposes  All illustrations and images included in CareNotes® are the copyrighted property of [employer] M [employer] Inc  or Miguel Simon  The above information is an  only  It is not intended as medical advice for individual conditions or treatments  Talk to your doctor, nurse or pharmacist before following any medical regimen to see if it is safe and effective for you  Corneal Abrasion   WHAT YOU NEED TO KNOW:   A corneal abrasion is a scratch on the cornea of your eye  The cornea is the clear layer that covers the front of your eye  A small scratch may heal in 1 to 2 days  Deeper or larger scratches may take longer to heal         DISCHARGE INSTRUCTIONS:   Contact your healthcare provider if:   · Your eye pain or vision gets worse  · You have yellow or green drainage from your eye  · You have questions or concerns about your condition or care  Medicines:   · Medicines  may be given in the form of eyedrops or ointment to help prevent an eye infection  You may also be given eye drops to decrease pain  Ask how to take this medicine safely       · Take your medicine as directed  Contact your healthcare provider if you think your medicine is not helping or if you have side effects  Tell him or her if you are allergic to any medicine  Keep a list of the medicines, vitamins, and herbs you take  Include the amounts, and when and why you take them  Bring the list or the pill bottles to follow-up visits  Carry your medicine list with you in case of an emergency  Follow up with your healthcare provider as directed:  Write down your questions so you remember to ask them during your visits  Self-care:   · Do not touch or rub your eye  · Ask your healthcare provider when you can start your normal activities  · Ask your healthcare provider when you can wear your contact lenses  · Wear sunglasses in bright light until your eyes feel better  Help prevent corneal abrasions:   · Remove your contact lenses if your eyes feel dry or irritated  · Wash your hands if you need to touch your eyes or your face  · Trim your child's fingernails so he cannot scratch his eye  · Wear protective eyewear when you work with chemicals, wood, dust, or metal      · Wear protective eyewear when you play sports  · Do not wear your contacts for longer than you should  · Do not wear colored lenses or lenses with shapes on them  These lenses may cause eye damage and vision loss  · Do not wear glitter makeup  Glitter can easily get into your eyes and under contact lenses  · Do not sleep with your contacts in your eyes  © 2017 [facility] Information is for End User's use only and may not be sold, redistributed or otherwise used for commercial purposes  All illustrations and images included in CareNotes® are the copyrighted property of [employer] M [employer] Inc  or Miguel Simon  The above information is an  only  It is not intended as medical advice for individual conditions or treatments   Talk to your doctor, nurse or pharmacist before following any medical regimen to see if it is safe and effective for you

## 2019-01-22 NOTE — DISCHARGE SUMMARY
IMR Discharge Summary - Medical Patrick Sarwat Dominguez 34 y o  male MRN: [id number]    [address] [facility]  Room / Bed: [facility] 704/[facility] 349-09 Encounter: [id number]    BRIEF OVERVIEW    Admitting Provider: Marvella Apley, DO  Discharge Provider: Sera Dolan MD  Primary Care Physician at Discharge: Patient will establish PCP care    [address] [facility]  Admission Date: 1/20/2019     Discharge Date: 1/22/2019  [facility] Course  Per Joanne Sample PA-C note dated 1/21/2019 4:13PM:  "Per Dr Rosemary Purvis, "Sonal Door a 34 y  o  male who presents to the emergency department by ambulance, patient was reported to be dropped off by his friend at home and was extremely agitated and fighting with imaginary people  Eden Andrews was also fighting with renal people and stationary objects as per ED report  Noam Goodman was report patient may have smoked wet   Patient received a total of 800 of ketamine IM as well as Versed 5 mg IM by EMS and was still agitated   Patient arrived handcuffed to [facility] Haritha was initially calm and then became violent again   Endotracheal intubation was performed as patient was harmful to himself and staff and he was not able to be cared for appropriately      While in the emergency department patient had episodes of desaturation when he wakes up  Calleen Failing becomes very agitated when sedation is held, he moves all extremities and attempts to sit up in bed and will flail his extremities   Chest x-ray with right lower lobe consolidation likely from aspiration   Urinary drug screen was positive for PCP, marijuana and cocaine "     Bronchoscopy performed early in AM due to concern for aspiration and episodes of hypoxia (required vecuronium for bronch)  Preliminary results reveal 2+ gram positive cocci in pairs and chain  Pt was taken off sedative medications (precedex 0 4, fentanyl 150, propofol 50) around 9am and was extubated around 9:15 AM to NC without any complications  Maintained O2 sats in the high 90s on RA  Patient remained calm and cooperative without any episodes of agitation  Later in the day, patient began to complain of right eye pain  Initial fluorescein exam was unimpressive  Pt continued to c/o severe right eye pain and ophthalmology was consulted  Opthalmology examination revealed corneal abrasion on right eye  Began erythromycin ophthalmic ointment "    Following extubation the patient complained of right-sided pleuritic chest pain and cough  Repeat chest x-rays were ordered which showed continuing of improvement in aeration, no new focal consolidations  His vital signs remain stable, he was afebrile, lactate and Procal were normal   Patient was seen by behavioral health and refused hose referral, thus he was discharged in good condition with outpatient referral to [facility]  Presenting Problem/History of Present Illness  Principal Problem (Resolved):    Acute respiratory failure with hypoxia (HCC)  Active Problems:    Hypokalemia    Polysubstance abuse (HCC)    Corneal abrasion, right  Resolved Problems:    Accidental overdose    Altered mental state    Aggressive behavior    Acute kidney injury ([address] )    Metabolic acidosis    1) Polysubstance overdose - Pt successfully extubated and remains calm and cooperative  No episodes of agression or delirium since extubation and pt out of restraints and off of one-to-one               - UDS positive for cocaine, PCP, and THC              - AVSS, afebrile, HR 85, , O2 sat 94 on RA, RR 20              - Pt uninterested in referrals for drug rehab at this time              - Patient without insurance so psychiatry recommends referral for [facility] intake              - Monitor for signs of withdrawal     2) Suspected aspiration event - Pt c/o some right sided chest pain when he coughs and pleurisy                - WBC 7 6, PCT WNL on 1/21, pending repeat              - Restarted home albuterol              - CXR in 1/21 AM demonstrating residual right basilar infiltrate              - Pending repeat CXR              - Pending bronch cultures, stain growing 2+ polys and 2+ GPCs in pairs and chains              - Hold abx at this time              - Pain regimen: Tylenol 975 Q8h for mild pain, tramadol 50mg Q6h moderate pain, oxycodone 5mg Q6h severe pain     3) Corneal abrasion - Complains of pain and foreign body sensation since extubation              - Seen by ophthalmology and started on erythromycin ointment Q2h to right eye              - F/u outpatient opthalmology     4) Tobacco abuse              - Continue nicotine patch    Diagnostic Procedures Performed  Imaging Studies:  Xr Chest Portable    Result Date: 1/21/2019  Impression: Improved right basilar aeration with some residual atelectasis or infiltrate  Workstation performed: [id number]     Xr Chest 1 View Portable    Result Date: 1/21/2019  Impression: Right basilar opacity which may represent atelectasis or infiltrate with possible small right pleural effusion  Follow-up advised  Workstation performed: [id number]     Xr Chest Pa & Lateral    Result Date: 1/22/2019  Impression: Continued interval improvement in right lung aeration with minimal residual right mid and lower lung opacity  No new focal consolidation  Workstation performed: [id number]     Xr Hand 3+ Views Left    Result Date: 1/21/2019  Impression: No acute osseous abnormality  Workstation performed: [id number]     Xr Hand 3+ Views Right    Result Date: 1/21/2019  Impression: No acute osseous abnormality  Workstation performed: [id number]     Ct Head Without Contrast    Result Date: 1/20/2019  Impression: No acute intracranial abnormality   Workstation performed: [id number]      Pertinent Labs:    Lab Results   Component Value Date    WBC 6 62 01/22/2019    HGB 14 4 01/22/2019    HCT 44 2 01/22/2019    MCV 83 01/22/2019     01/22/2019     Lab Results Component Value Date     07/31/2015    K 3 1 (L) 01/22/2019     01/22/2019    CO2 25 01/22/2019    ANIONGAP 7 07/31/2015    BUN 5 01/22/2019    CREATININE 1 04 01/22/2019    GLUCOSE 106 07/31/2015    CALCIUM 8 7 01/22/2019    AST 22 01/20/2019    ALT 51 01/20/2019    ALKPHOS 67 01/20/2019    PROT 8 5 (H) 07/29/2015    BILITOT 0 43 07/29/2015    EGFR 110 01/22/2019     Pain Management Panel     There is no flowsheet data to display  Therapeutic Procedures Performed  Intubation in ICU on 1/21/2019    Test Results Pending at Discharge: None     Medications     Medication List to be Continued at Discharge  Discharge Medication List as of 1/22/2019 12:43 PM      CONTINUE these medications which have NOT CHANGED    Details   albuterol (PROVENTIL HFA,VENTOLIN HFA) 90 mcg/act inhaler Inhale 2 puffs every 6 (six) hours as needed for wheezing, Historical Med           Discharge Medication List as of 1/22/2019 12:43 PM      START taking these medications    Details   erythromycin (ILOTYCIN) ophthalmic ointment Administer 0 5 inches to the right eye every 2 (two) hours for 5 days, Starting Tue 1/22/2019, Until Sun 1/27/2019, Print      polyvinyl alcohol (LIQUIFILM TEARS) 1 4 % ophthalmic solution Administer 2 drops to the right eye every 3 (three) hours as needed for dry eyes, Starting Tue 1/22/2019, Print           Discharge Medication List as of 1/22/2019 12:43 PM          Allergies  No Known Allergies  Discharge Diet: regular diet  Activity restrictions: none  Discharge Condition: good  Discharged With Lines: no    Discharge Disposition: Home/Self Care  Phone Number: [phone]    Outpatient Follow-Up  Patient instructed to establish PCP care and given outpatient referral [facility]      Code Status: Level 1 - Full Code    Discharge  Statement   I spent 30 minutes minutes discharging the patient  This time was spent on the day of discharge   I had direct contact with the patient on the day of discharge  Additional documentation is required if more than 30 minutes were spent on discharge

## 2019-01-23 LAB
BACTERIA BRONCH AEROBE CULT: NORMAL
GRAM STN SPEC: NORMAL

## 2019-01-26 LAB
BACTERIA BLD CULT: NORMAL
BACTERIA BLD CULT: NORMAL

## 2019-03-30 ENCOUNTER — HOSPITAL ENCOUNTER (EMERGENCY)
Facility: HOSPITAL | Age: 34
Discharge: HOME | End: 2019-03-30
Attending: EMERGENCY MEDICINE | Admitting: EMERGENCY MEDICINE

## 2019-03-30 VITALS
RESPIRATION RATE: 19 BRPM | OXYGEN SATURATION: 94 % | DIASTOLIC BLOOD PRESSURE: 56 MMHG | SYSTOLIC BLOOD PRESSURE: 115 MMHG | HEART RATE: 90 BPM

## 2019-03-30 DIAGNOSIS — T78.2XXA ANAPHYLAXIS, INITIAL ENCOUNTER: Primary | ICD-10-CM

## 2019-03-30 PROCEDURE — 94640 AIRWAY INHALATION TREATMENT: CPT

## 2019-03-30 PROCEDURE — 99283 EMERGENCY DEPT VISIT LOW MDM: CPT

## 2019-03-30 PROCEDURE — 96361 HYDRATE IV INFUSION ADD-ON: CPT

## 2019-03-30 PROCEDURE — 96375 TX/PRO/DX INJ NEW DRUG ADDON: CPT

## 2019-03-30 PROCEDURE — 96374 THER/PROPH/DIAG INJ IV PUSH: CPT

## 2019-03-30 PROCEDURE — 96372 THER/PROPH/DIAG INJ SC/IM: CPT

## 2019-03-30 RX ORDER — DIPHENHYDRAMINE HYDROCHLORIDE 50 MG/ML
50 INJECTION INTRAMUSCULAR; INTRAVENOUS ONCE
Status: COMPLETED | OUTPATIENT
Start: 2019-03-30 | End: 2019-03-30

## 2019-03-30 RX ORDER — EPINEPHRINE 1 MG/ML
0.3 INJECTION, SOLUTION, CONCENTRATE INTRAVENOUS ONCE
Status: COMPLETED | OUTPATIENT
Start: 2019-03-30 | End: 2019-03-30

## 2019-03-30 RX ORDER — PREDNISONE 20 MG/1
40 TABLET ORAL DAILY
Qty: 8 TABLET | Refills: 0 | Status: SHIPPED | OUTPATIENT
Start: 2019-03-30 | End: 2019-04-03

## 2019-03-30 RX ORDER — DIPHENHYDRAMINE HCL 25 MG
25 TABLET ORAL EVERY 6 HOURS
Qty: 20 TABLET | Refills: 0 | Status: SHIPPED | OUTPATIENT
Start: 2019-03-30

## 2019-03-30 RX ORDER — EPINEPHRINE 0.3 MG/.3ML
0.3 INJECTION SUBCUTANEOUS ONCE
Qty: 0.6 ML | Refills: 0 | Status: SHIPPED | OUTPATIENT
Start: 2019-03-30 | End: 2019-03-30

## 2019-03-30 RX ORDER — METHYLPREDNISOLONE SODIUM SUCCINATE 125 MG/2ML
125 INJECTION, POWDER, LYOPHILIZED, FOR SOLUTION INTRAMUSCULAR; INTRAVENOUS ONCE
Status: COMPLETED | OUTPATIENT
Start: 2019-03-30 | End: 2019-03-30

## 2019-03-30 RX ORDER — FAMOTIDINE 20 MG/1
20 TABLET, FILM COATED ORAL ONCE
Status: COMPLETED | OUTPATIENT
Start: 2019-03-30 | End: 2019-03-30

## 2019-03-30 RX ORDER — IPRATROPIUM BROMIDE AND ALBUTEROL SULFATE 2.5; .5 MG/3ML; MG/3ML
3 SOLUTION RESPIRATORY (INHALATION) ONCE
Status: COMPLETED | OUTPATIENT
Start: 2019-03-30 | End: 2019-03-30

## 2019-03-30 RX ADMIN — METHYLPREDNISOLONE SODIUM SUCCINATE 125 MG: 125 INJECTION, POWDER, FOR SOLUTION INTRAMUSCULAR; INTRAVENOUS at 01:06

## 2019-03-30 RX ADMIN — DIPHENHYDRAMINE HYDROCHLORIDE 50 MG: 50 INJECTION, SOLUTION INTRAMUSCULAR; INTRAVENOUS at 01:10

## 2019-03-30 RX ADMIN — SODIUM CHLORIDE 1000 ML: 0.9 INJECTION, SOLUTION INTRAVENOUS at 01:04

## 2019-03-30 RX ADMIN — FAMOTIDINE 20 MG: 20 TABLET ORAL at 01:04

## 2019-03-30 RX ADMIN — IPRATROPIUM BROMIDE AND ALBUTEROL SULFATE 3 ML: 2.5; .5 SOLUTION RESPIRATORY (INHALATION) at 01:05

## 2019-03-30 RX ADMIN — EPINEPHRINE 0.3 MG: 1 INJECTION, SOLUTION, CONCENTRATE INTRAVENOUS at 01:01

## 2022-11-18 ENCOUNTER — APPOINTMENT (OUTPATIENT)
Dept: LAB | Facility: CLINIC | Age: 37
End: 2022-11-18

## 2022-11-18 DIAGNOSIS — Z79.899 ENCOUNTER FOR LONG-TERM (CURRENT) USE OF OTHER MEDICATIONS: ICD-10-CM

## 2022-11-18 LAB
25(OH)D3 SERPL-MCNC: 10 NG/ML (ref 30–100)
ALBUMIN SERPL BCP-MCNC: 3.9 G/DL (ref 3.5–5)
ALP SERPL-CCNC: 60 U/L (ref 46–116)
ALT SERPL W P-5'-P-CCNC: 36 U/L (ref 12–78)
ANION GAP SERPL CALCULATED.3IONS-SCNC: 7 MMOL/L (ref 4–13)
AST SERPL W P-5'-P-CCNC: 22 U/L (ref 5–45)
BASOPHILS # BLD AUTO: 0.04 THOUSANDS/ÂΜL (ref 0–0.1)
BASOPHILS NFR BLD AUTO: 1 % (ref 0–1)
BILIRUB SERPL-MCNC: 0.7 MG/DL (ref 0.2–1)
BUN SERPL-MCNC: 13 MG/DL (ref 5–25)
CALCIUM SERPL-MCNC: 9.9 MG/DL (ref 8.3–10.1)
CHLORIDE SERPL-SCNC: 106 MMOL/L (ref 96–108)
CHOLEST SERPL-MCNC: 219 MG/DL
CO2 SERPL-SCNC: 23 MMOL/L (ref 21–32)
CREAT SERPL-MCNC: 1.26 MG/DL (ref 0.6–1.3)
EOSINOPHIL # BLD AUTO: 0.24 THOUSAND/ÂΜL (ref 0–0.61)
EOSINOPHIL NFR BLD AUTO: 6 % (ref 0–6)
ERYTHROCYTE [DISTWIDTH] IN BLOOD BY AUTOMATED COUNT: 13.4 % (ref 11.6–15.1)
GFR SERPL CREATININE-BSD FRML MDRD: 73 ML/MIN/1.73SQ M
GLUCOSE P FAST SERPL-MCNC: 107 MG/DL (ref 65–99)
HCT VFR BLD AUTO: 47.5 % (ref 36.5–49.3)
HDLC SERPL-MCNC: 33 MG/DL
HGB BLD-MCNC: 15.7 G/DL (ref 12–17)
IMM GRANULOCYTES # BLD AUTO: 0.01 THOUSAND/UL (ref 0–0.2)
IMM GRANULOCYTES NFR BLD AUTO: 0 % (ref 0–2)
LDLC SERPL CALC-MCNC: 115 MG/DL (ref 0–100)
LYMPHOCYTES # BLD AUTO: 2.35 THOUSANDS/ÂΜL (ref 0.6–4.47)
LYMPHOCYTES NFR BLD AUTO: 54 % (ref 14–44)
MCH RBC QN AUTO: 26.3 PG (ref 26.8–34.3)
MCHC RBC AUTO-ENTMCNC: 33.1 G/DL (ref 31.4–37.4)
MCV RBC AUTO: 80 FL (ref 82–98)
MONOCYTES # BLD AUTO: 0.39 THOUSAND/ÂΜL (ref 0.17–1.22)
MONOCYTES NFR BLD AUTO: 9 % (ref 4–12)
NEUTROPHILS # BLD AUTO: 1.29 THOUSANDS/ÂΜL (ref 1.85–7.62)
NEUTS SEG NFR BLD AUTO: 30 % (ref 43–75)
NONHDLC SERPL-MCNC: 186 MG/DL
NRBC BLD AUTO-RTO: 0 /100 WBCS
PLATELET # BLD AUTO: 194 THOUSANDS/UL (ref 149–390)
PMV BLD AUTO: 12.3 FL (ref 8.9–12.7)
POTASSIUM SERPL-SCNC: 3.9 MMOL/L (ref 3.5–5.3)
PROT SERPL-MCNC: 8.2 G/DL (ref 6.4–8.4)
RBC # BLD AUTO: 5.97 MILLION/UL (ref 3.88–5.62)
SODIUM SERPL-SCNC: 136 MMOL/L (ref 135–147)
T4 SERPL-MCNC: 9.4 UG/DL (ref 4.7–13.3)
TRIGL SERPL-MCNC: 355 MG/DL
TSH SERPL DL<=0.05 MIU/L-ACNC: 1.29 UIU/ML (ref 0.45–4.5)
WBC # BLD AUTO: 4.32 THOUSAND/UL (ref 4.31–10.16)

## 2022-11-19 LAB — OXYCODONE+OXYMORPHONE UR QL SCN: NEGATIVE NG/ML

## 2022-12-20 LAB
AMPHETAMINES UR QL SCN: NEGATIVE NG/ML
BARBITURATES UR QL SCN: NEGATIVE NG/ML
BENZODIAZ UR QL: NEGATIVE NG/ML
BZE UR QL: NEGATIVE NG/ML
CANNABINOIDS UR QL SCN: POSITIVE
METHADONE UR QL SCN: NEGATIVE NG/ML
OPIATES UR QL: NEGATIVE NG/ML
PCP UR QL: NEGATIVE NG/ML
PROPOXYPH UR QL SCN: NEGATIVE NG/ML

## 2024-04-03 ENCOUNTER — HOSPITAL ENCOUNTER (EMERGENCY)
Facility: HOSPITAL | Age: 39
Discharge: HOME | End: 2024-04-03
Attending: EMERGENCY MEDICINE
Payer: COMMERCIAL

## 2024-04-03 VITALS
SYSTOLIC BLOOD PRESSURE: 141 MMHG | HEART RATE: 77 BPM | DIASTOLIC BLOOD PRESSURE: 90 MMHG | OXYGEN SATURATION: 98 % | TEMPERATURE: 96.8 F | RESPIRATION RATE: 18 BRPM

## 2024-04-03 DIAGNOSIS — M54.50 ACUTE LOW BACK PAIN: Primary | ICD-10-CM

## 2024-04-03 PROCEDURE — 96372 THER/PROPH/DIAG INJ SC/IM: CPT

## 2024-04-03 PROCEDURE — 99284 EMERGENCY DEPT VISIT MOD MDM: CPT | Performed by: EMERGENCY MEDICINE

## 2024-04-03 PROCEDURE — 99282 EMERGENCY DEPT VISIT SF MDM: CPT

## 2024-04-03 RX ORDER — NAPROXEN 500 MG/1
500 TABLET ORAL 2 TIMES DAILY WITH MEALS
Qty: 14 TABLET | Refills: 0 | Status: SHIPPED | OUTPATIENT
Start: 2024-04-03 | End: 2024-04-10

## 2024-04-03 RX ORDER — LIDOCAINE 50 MG/G
1 PATCH TOPICAL DAILY
Qty: 7 PATCH | Refills: 0 | Status: SHIPPED | OUTPATIENT
Start: 2024-04-03

## 2024-04-03 RX ORDER — DIAZEPAM 5 MG/1
5 TABLET ORAL ONCE
Status: COMPLETED | OUTPATIENT
Start: 2024-04-03 | End: 2024-04-03

## 2024-04-03 RX ORDER — LIDOCAINE 50 MG/G
1 PATCH TOPICAL ONCE
Status: DISCONTINUED | OUTPATIENT
Start: 2024-04-03 | End: 2024-04-03 | Stop reason: HOSPADM

## 2024-04-03 RX ORDER — KETOROLAC TROMETHAMINE 30 MG/ML
15 INJECTION, SOLUTION INTRAMUSCULAR; INTRAVENOUS ONCE
Status: COMPLETED | OUTPATIENT
Start: 2024-04-03 | End: 2024-04-03

## 2024-04-03 RX ORDER — ACETAMINOPHEN 325 MG/1
975 TABLET ORAL ONCE
Status: COMPLETED | OUTPATIENT
Start: 2024-04-03 | End: 2024-04-03

## 2024-04-03 RX ADMIN — LIDOCAINE 1 PATCH: 50 PATCH TOPICAL at 11:10

## 2024-04-03 RX ADMIN — DIAZEPAM 5 MG: 5 TABLET ORAL at 11:05

## 2024-04-03 RX ADMIN — ACETAMINOPHEN 975 MG: 325 TABLET, FILM COATED ORAL at 11:04

## 2024-04-03 RX ADMIN — DIAZEPAM 5 MG: 5 TABLET ORAL at 11:55

## 2024-04-03 RX ADMIN — DEXAMETHASONE 10 MG: 4 TABLET ORAL at 11:55

## 2024-04-03 RX ADMIN — KETOROLAC TROMETHAMINE 15 MG: 30 INJECTION, SOLUTION INTRAMUSCULAR; INTRAVENOUS at 11:05

## 2024-04-03 NOTE — ED PROVIDER NOTES
"History  Chief Complaint   Patient presents with    Back Pain     Patient states he has back pain that started this morning. He felt a \"pop\" and has been unable to walk since     Patient is a 38-year-old male with a significant past medical history of asthma, presenting for evaluation of back pain.  Patient reports that he was brushing his teeth this morning and made an awkward motion and experienced sudden pain in his low back.  He reports that he has not had any changes in sensation or saddle anesthesia.  He denies any urinary retention or incontinence.  He has remained ambulatory.  Patient denies any fevers.  He denies any history of IV drug use.  He denies any history of surgical procedures on his back or back issues.  He has not attempted anything for his symptoms.        Prior to Admission Medications   Prescriptions Last Dose Informant Patient Reported? Taking?   EPINEPHrine (EPIPEN) 0.3 mg/0.3 mL SOAJ   No No   Sig: Inject 0.3 mL (0.3 mg total) into a muscle once for 1 dose   albuterol (PROVENTIL HFA,VENTOLIN HFA) 90 mcg/act inhaler  Self Yes No   Sig: Inhale 2 puffs every 6 (six) hours as needed for wheezing   diphenhydrAMINE (BENADRYL) 25 mg tablet   No No   Sig: Take 1 tablet (25 mg total) by mouth every 6 (six) hours   polyvinyl alcohol (LIQUIFILM TEARS) 1.4 % ophthalmic solution   No No   Sig: Administer 2 drops to the right eye every 3 (three) hours as needed for dry eyes      Facility-Administered Medications: None       Past Medical History:   Diagnosis Date    Asthma        No past surgical history on file.    No family history on file.  I have reviewed and agree with the history as documented.    E-Cigarette/Vaping     E-Cigarette/Vaping Substances     Social History     Tobacco Use    Smoking status: Every Day     Current packs/day: 0.50     Average packs/day: 0.5 packs/day for 10.0 years (5.0 ttl pk-yrs)     Types: Cigarettes    Smokeless tobacco: Never   Substance Use Topics    Alcohol use: Yes "     Comment: social    Drug use: No     Comment: pt denies        Review of Systems   Respiratory:  Negative for shortness of breath.    Cardiovascular:  Negative for chest pain.   Gastrointestinal:  Negative for abdominal pain.   Genitourinary:  Negative for difficulty urinating and enuresis.   Musculoskeletal:  Positive for back pain.   Neurological:  Negative for weakness and numbness.       Physical Exam  ED Triage Vitals [04/03/24 0948]   Temperature Pulse Respirations Blood Pressure SpO2   (!) 96.8 °F (36 °C) 76 18 141/90 100 %      Temp Source Heart Rate Source Patient Position - Orthostatic VS BP Location FiO2 (%)   Temporal Monitor -- -- --      Pain Score       10 - Worst Possible Pain             Orthostatic Vital Signs  Vitals:    04/03/24 0948 04/03/24 1157   BP: 141/90    Pulse: 76 77       Physical Exam  Vitals and nursing note reviewed.   Constitutional:       General: He is not in acute distress.     Appearance: Normal appearance. He is not ill-appearing or toxic-appearing.   HENT:      Head: Normocephalic and atraumatic.      Right Ear: External ear normal.      Left Ear: External ear normal.      Nose: Nose normal.   Eyes:      General: No scleral icterus.        Right eye: No discharge.         Left eye: No discharge.      Extraocular Movements: Extraocular movements intact.      Conjunctiva/sclera: Conjunctivae normal.   Cardiovascular:      Rate and Rhythm: Normal rate.      Heart sounds: Normal heart sounds. No murmur heard.     No friction rub. No gallop.   Pulmonary:      Effort: Pulmonary effort is normal. No respiratory distress.      Breath sounds: Normal breath sounds.   Abdominal:      General: Abdomen is flat. There is no distension.      Palpations: Abdomen is soft. There is no mass.      Tenderness: There is no abdominal tenderness.   Genitourinary:     Comments: Deferred  Musculoskeletal:      Comments: Tenderness of the bilateral paralumbar muscles, no bony vertebral tenderness. No  stepoffs, deformities or skin changes. ROM limited due to pain. Strength of bilateral lower extremities 5/5 in ankle flexion and extension. EHL intact. Sensation intact including S1 distribution. DP/PT pulses 2+/4.    Skin:     General: Skin is warm and dry.   Neurological:      General: No focal deficit present.      Mental Status: He is alert.         ED Medications  Medications   ketorolac (TORADOL) injection 15 mg (15 mg Intramuscular Given 4/3/24 1105)   acetaminophen (TYLENOL) tablet 975 mg (975 mg Oral Given 4/3/24 1104)   diazepam (VALIUM) tablet 5 mg (5 mg Oral Given 4/3/24 1105)   diazepam (VALIUM) tablet 5 mg (5 mg Oral Given 4/3/24 1155)   dexamethasone (DECADRON) tablet 10 mg (10 mg Oral Given 4/3/24 1155)       Diagnostic Studies  Results Reviewed       None                   No orders to display         Procedures  Procedures      ED Course                             SBIRT 22yo+      Flowsheet Row Most Recent Value   Initial Alcohol Screen: US AUDIT-C     1. How often do you have a drink containing alcohol? 3 Filed at: 04/03/2024 0950   2. How many drinks containing alcohol do you have on a typical day you are drinking?  2 Filed at: 04/03/2024 0950   3a. Male UNDER 65: How often do you have five or more drinks on one occasion? 3 Filed at: 04/03/2024 0950   Audit-C Score 8 Filed at: 04/03/2024 0950   Full Alcohol Screen: US AUDIT    4. How often during the last year have you found that you were not able to stop drinking once you had started? 0 Filed at: 04/03/2024 0950   5. How often during past year have you failed to do what was normally expected of you because of drinking?  0 Filed at: 04/03/2024 0950   6. How often in past year have you needed a first drink in the morning to get yourself going after a heavy drinking session?  0 Filed at: 04/03/2024 0950   7. How often in past year have you had feeling of guilt or remorse after drinking?  0 Filed at: 04/03/2024 0950   8. How often in past year have  you been unable to remember what happened night before because you had been drinking?  0 Filed at: 04/03/2024 0950   9. Have you or someone else been injured as a result of your drinking?  0 Filed at: 04/03/2024 0950   10. Has a relative, friend, doctor or other health worker been concerned about your drinking and suggested you cut down?  0 Filed at: 04/03/2024 0950   AUDIT Total Score 8 Filed at: 04/03/2024 0950   CARLIE: How many times in the past year have you...    Used an illegal drug or used a prescription medication for non-medical reasons? Daily or Almost Daily  [marijuana] Filed at: 04/03/2024 0950                  Medical Decision Making  Patient with history as above presented with back pain. History obtained from patient.    Differential diagnosis includes: muscular strain, muscular spasm, lumbago    Plan: toradol, tylenol, valium, decadron    Reviewed external records.  Patient symptoms improved with above.  Patient presentation most consistent with nonemergent back pain, likely muscular strain/spasm.  Stable for outpatient management.  Patient given referral to comprehensive spine.  Patient given prescription for naproxen as well as Lidoderm.    Disposition: Discharged with instructions to obtain outpatient follow up of patient's symptoms and findings, with strict return precautions if patient develops new or worsening symptoms. Patient understands this plan and is agreeable. All questions answered. Patient discharged home with return precautions.    Risk  OTC drugs.  Prescription drug management.          Disposition  Final diagnoses:   Acute low back pain     Time reflects when diagnosis was documented in both MDM as applicable and the Disposition within this note       Time User Action Codes Description Comment    4/3/2024 10:51 AM Romulo Rolle Add [M54.50] Acute low back pain           ED Disposition       ED Disposition   Discharge    Condition   Stable    Date/Time   Wed Apr 3, 2024 4789     Comment   Patrick Santos discharge to home/self care.                   Follow-up Information       Follow up With Specialties Details Why Contact Info    [employer]  Call  To find a primary care doctor [phone]              Discharge Medication List as of 4/3/2024 11:50 AM        START taking these medications    Details   lidocaine (Lidoderm) 5 % Apply 1 patch topically over 12 hours daily Remove & Discard patch within 12 hours or as directed by MD, Starting Wed 4/3/2024, Normal      naproxen (Naprosyn) 500 mg tablet Take 1 tablet (500 mg total) by mouth 2 (two) times a day with meals for 7 days, Starting Wed 4/3/2024, Until Wed 4/10/2024, Normal           CONTINUE these medications which have NOT CHANGED    Details   albuterol (PROVENTIL HFA,VENTOLIN HFA) 90 mcg/act inhaler Inhale 2 puffs every 6 (six) hours as needed for wheezing, Historical Med      diphenhydrAMINE (BENADRYL) 25 mg tablet Take 1 tablet (25 mg total) by mouth every 6 (six) hours, Starting Sat 3/30/2019, Normal      EPINEPHrine (EPIPEN) 0.3 mg/0.3 mL SOAJ Inject 0.3 mL (0.3 mg total) into a muscle once for 1 dose, Starting Sat 3/30/2019, Normal      polyvinyl alcohol (LIQUIFILM TEARS) 1.4 % ophthalmic solution Administer 2 drops to the right eye every 3 (three) hours as needed for dry eyes, Starting Tue 1/22/2019, Print               PDMP Review       None             ED Provider  Attending physically available and evaluated Patrick Santos. I managed the patient along with the ED Attending.    Electronically Signed by           Romulo Rolle DO  04/03/24 4964

## 2024-04-03 NOTE — DISCHARGE INSTRUCTIONS
You were evaluated in the Emergency Department today for your back pain. Your evaluation did not show signs of medical conditions requiring emergent intervention at this time, and we feel safe discharging you home.    I gave you a prescription for naproxen and lidoderm patches. These are at your pharmacy. Please take as prescribed. You can also take tylenol for pain. This medication is over the counter.    Please schedule an appointment for follow-up with your primary care physician this week for further evaluation of your symptoms. I also wrote a referral for the comprehensive spine program. Please follow up with them.    Return to the Emergency Department if you experience worsening back pain, difficulty walking, fevers, numbness, tingling, difficulty urinating, incontinence, or any other concerning symptoms.    Thank you for choosing us for your care.

## 2024-04-03 NOTE — ED ATTENDING ATTESTATION
4/3/2024  I, Jordy Maldonado MD, saw and evaluated the patient. I have discussed the patient with the resident/non-physician practitioner and agree with the resident's/non-physician practitioner's findings, Plan of Care, and MDM as documented in the resident's/non-physician practitioner's note, except where noted. All available labs and Radiology studies were reviewed.  I was present for key portions of any procedure(s) performed by the resident/non-physician practitioner and I was immediately available to provide assistance.       At this point I agree with the current assessment done in the Emergency Department.  I have conducted an independent evaluation of this patient a history and physical is as follows:    38-year-old male presenting with low back pain.  No red flags.  There is some tenderness across the low back.  No focal neurodeficit.  Will treat symptomatically.    ED Course         Critical Care Time  Procedures

## 2024-04-03 NOTE — Clinical Note
Patrick Santos was seen and treated in our emergency department on 4/3/2024.    ?    ?    ?    Diagnosis: ?    Patrick  ?.    He may return on this date: 04/05/2024    Please excuse Patrick Santos for court missed as he was in the emergency department.     If you have any questions or concerns, please don't hesitate to call.      Romulo Rolle, DO    ______________________________           _______________          _______________  Hospital Representative                              Date                                Time

## 2024-04-04 ENCOUNTER — TELEPHONE (OUTPATIENT)
Dept: PHYSICAL THERAPY | Facility: OTHER | Age: 39
End: 2024-04-04

## 2024-04-30 ENCOUNTER — NURSE TRIAGE (OUTPATIENT)
Dept: PHYSICAL THERAPY | Facility: OTHER | Age: 39
End: 2024-04-30

## 2024-04-30 DIAGNOSIS — M54.41 ACUTE BILATERAL LOW BACK PAIN WITH BILATERAL SCIATICA: Primary | ICD-10-CM

## 2024-04-30 DIAGNOSIS — M54.42 ACUTE BILATERAL LOW BACK PAIN WITH BILATERAL SCIATICA: Primary | ICD-10-CM

## 2024-04-30 NOTE — TELEPHONE ENCOUNTER
Additional Information   Negative: Is this related to a work injury?   Negative: Is this related to an MVA?   Negative: Are you currently recieving homecare services?    Background - Initial Assessment  Clinical complaint: Pain is bilat low back radiates down bilat legs to the thigh. Has numbness and tingling in bilat low back. Pain started 4/2/24, NKI Was brushing teeth and made an awkward movement, felt a pop. No prior back sx. Has had some back pain in the past, but never needing medical attention, and not this constant or painful. Was seen in ED 4/3/24. Pain is constant and feels aching, sharp and shooting.   Date of onset: 4/2/24  Frequency of pain: constant  Quality of pain: aching, sharp, and shooting    Protocols used: [facility] Protocol

## 2024-04-30 NOTE — TELEPHONE ENCOUNTER
Additional Information   Negative: Has the patient had unexplained weight loss?   Negative: Does the patient have a fever?   Negative: Is the patient experiencing urine retention?   Negative: Is the patient experiencing acute drop foot or paralysis?   Negative: Has the patient experienced major trauma? (fall from height, high speed collision, direct blow to spine) and is also experiencing nausea, light-headedness, or loss of consciousness?   Negative: Is the patient experiencing blood in sputum?   Negative: Is this a chronic condition?    Protocols used: [facility] Protocol    [facility] was reviewed in detail and what we can provide for their back pain.  Patient is agreeable to being triaged and would like to proceed with Physical Therapy.    Referral was placed for Physical Therapy at the [facility] site. Patients information was sent to the  to make evaluation appointment. Patient made aware that the PT office  will be calling to schedule the appointment.  Patient was provided with the phone number to the PT office.    No further questions and/or concerns were voiced by the patient at this time. Patient states understanding of the referral that was placed.

## 2024-05-07 ENCOUNTER — APPOINTMENT (OUTPATIENT)
Dept: PHYSICAL THERAPY | Facility: REHABILITATION | Age: 39
End: 2024-05-07
Payer: COMMERCIAL

## 2024-05-08 ENCOUNTER — EVALUATION (OUTPATIENT)
Dept: PHYSICAL THERAPY | Facility: REHABILITATION | Age: 39
End: 2024-05-08
Payer: COMMERCIAL

## 2024-05-08 VITALS — DIASTOLIC BLOOD PRESSURE: 88 MMHG | TEMPERATURE: 97.8 F | SYSTOLIC BLOOD PRESSURE: 136 MMHG | HEART RATE: 86 BPM

## 2024-05-08 DIAGNOSIS — M54.42 ACUTE BILATERAL LOW BACK PAIN WITH BILATERAL SCIATICA: Primary | ICD-10-CM

## 2024-05-08 DIAGNOSIS — M54.41 ACUTE BILATERAL LOW BACK PAIN WITH BILATERAL SCIATICA: Primary | ICD-10-CM

## 2024-05-08 PROCEDURE — 97110 THERAPEUTIC EXERCISES: CPT | Performed by: PHYSICAL THERAPIST

## 2024-05-08 PROCEDURE — 97162 PT EVAL MOD COMPLEX 30 MIN: CPT | Performed by: PHYSICAL THERAPIST

## 2024-05-08 NOTE — PROGRESS NOTES
PT Evaluation     Today's date: 2024  Patient name: Patrick Santos  : 1985  MRN: [id number]  Referring provider: Zen Gutierres PT  Dx:   Encounter Diagnosis     ICD-10-CM    1. Acute bilateral low back pain with bilateral sciatica  M54.42 Ambulatory referral to PT spine    M54.41                      Assessment  Assessment details: Patient presents with pain, decreased lumbar ROM, decreased hip/core strength, postural deficits and decreased function secondary to acute on chronic lumbar radiculopathy.  Patient would benefit from skilled PT intervention to address these issues and to maximize function.  Thank you for the referral.  Impairments: abnormal or restricted ROM, activity intolerance, impaired physical strength, lacks appropriate home exercise program, pain with function, poor posture  and poor body mechanics  Other impairment: impiared LE flexibility  Understanding of Dx/Px/POC: good   Prognosis: good    Goals  Short Term:  Pt will report decreased levels of pain by at least 2 subjective ratings in 4 weeks  Pt will demonstrate improved ROM by at least 25% in 4 weeks  Pt will demonstrate improved strength by 1/2 grade MMT in 4 weeks  Long Term:   Pt will be independent in their HEP in 8 weeks  Pt will demonstrate improved FOTO, > predicted level  Pt will be independent with all ADL's  Pt will be independent with household activities.     Plan  Plan details: Patient was educated in HEP and Plan of Care.  All questions were answered to pt's satisfaction.      Patient would benefit from: skilled physical therapy  Planned therapy interventions: manual therapy, abdominal trunk stabilization, joint mobilization, body mechanics training, neuromuscular re-education, patient education, postural training, strengthening, stretching, therapeutic activities, therapeutic exercise, flexibility, graded exercise, transfer training and home exercise program  Frequency: 2x week  Duration in weeks: 6  Plan of  Care beginning date: 2024  Plan of Care expiration date: 2024  Treatment plan discussed with: patient    Subjective Evaluation    History of Present Illness  Mechanism of injury: Pt is a 38 y.o male with a c/o increased LBP after bending down to rinse his mouth out after brushing his teeth a few weeks ago.  Pt notes having insidious onset of LBP for a few months now, but recent exacerbation after this day. Pt went to the ED and was treated with pain medication.  Pt denies significant relief from this and is now referred for PT at this time.  Pt reports pain/difficulty with fishing, cutting grass, household activities, sitting >45 mins, driving>45 mins, dressing (difficulty with bending down to neela socks/shoes), sleep (intermittent disturbance).    Patient Goals  Patient goals for therapy: decreased pain, independence with ADLs/IADLs, increased strength and return to sport/leisure activities    Pain  At best pain ratin  At worst pain rating: 10  Location: lumbar spine and at times into b/l thighs (entire aspect)  Relieving factors: rest and medications (IBU)      Diagnostic Tests  No diagnostic tests performed  Treatments  No previous or current treatments    Objective     Concurrent Complaints  Negative for bladder dysfunction, bowel dysfunction and saddle (S4) numbness    Additional Special Questions  Pt denies unexplained weight loss.      Neurological Testing     Reflexes   Left   Patellar (L4): normal (2+)  Achilles (S1): normal (2+)  Clonus sign: negative    Right   Patellar (L4): normal (2+)  Achilles (S1): normal (2+)  Clonus sign: negative    Additional Neurological Details  Pt reports R>L foot n/t.      Active Range of Motion     Additional Active Range of Motion Details  L/S AROM (% of normal):  Flex=wfl with mild LBP; repeated=increased pain in same are of lumbar spine  Ext=50% with pain in lumbar spine; repeated= increased pain in same region  BSB=wfl, but pain with RSB on L side  B rot=wfl  with minimal pain    Strength/Myotome Testing     Left Hip   Planes of Motion   Flexion: 5  Extension: 4-  Abduction: 4  External rotation: 3+ (pain)    Right Hip   Planes of Motion   Flexion: 5  Extension: 4-  Abduction: 3+ (pain)  External rotation: 3+ (pain)    Left Knee   Flexion: 4- (pain)  Extension: 5    Right Knee   Flexion: 4- (pain)  Extension: 5    Left Ankle/Foot   Dorsiflexion: 5  Plantar flexion: 5 (seated)  Great toe extension: 5    Right Ankle/Foot   Dorsiflexion: 5  Plantar flexion: 5 (seated)  Great toe extension: 5    Tests     Lumbar   Positive prone instability .     Left   Negative crossed SLR, passive SLR and slump test.     Right   Negative crossed SLR, passive SLR and slump test.     Left Pelvic Girdle/Sacrum   Positive: active SLR test.     Right Pelvic Girdle/Sacrum   Positive: thigh thrust.   Negative: active SLR test.     Left Hip   Negative ONEL and long sit.     Right Hip   Positive ONEL.   Negative long sit.     Additional Tests Details  Decreased flexibility b/l HS and piriformis noted.    (+)active SLR w/stabilization on L    General Comments:      Lumbar Comments  Pt educated on log rolling technique with supine to sit transfers, proper body mechanics with lifting, and proper sitting posture.            Precautions: asthma, depression  POC expires Unit limit Auth Expiration date PT/OT + Visit Limit?   6/21 NA NA BOMN         Visit/Unit Tracking  AUTH Status:  Date 5/8        Auth after 24V Used 1         Remaining  23           Pertinent Findings:                                                                                            Test / Measure  5/8/2024   FOTO (Predicted 57) 35                       Manuals 5/8            R L/S gapping in L S/L Tp 2 mins low grade                                                   Neuro Re-Ed             Standing pball crush             Standing pball crush w/march             TA w/LPD             TA w/unilateral row in staggered stance              TA w/multifidus press             TA w/chops                          Ther Ex             TM amb             LTR             TA w/bridges             TA w/clamshells             TA w/prone hip ext             Prone press ups                          Pt education+ HEP instruction TP 10 mins            Ther Activity             TA w/squats             Suitcase carry             Gait Training                                       Modalities

## 2024-05-14 ENCOUNTER — APPOINTMENT (OUTPATIENT)
Dept: PHYSICAL THERAPY | Facility: REHABILITATION | Age: 39
End: 2024-05-14
Payer: COMMERCIAL

## 2024-05-28 ENCOUNTER — APPOINTMENT (OUTPATIENT)
Dept: PHYSICAL THERAPY | Facility: REHABILITATION | Age: 39
End: 2024-05-28
Payer: COMMERCIAL

## 2024-05-30 ENCOUNTER — APPOINTMENT (OUTPATIENT)
Dept: PHYSICAL THERAPY | Facility: REHABILITATION | Age: 39
End: 2024-05-30
Payer: COMMERCIAL

## 2024-09-27 NOTE — CONSULTS
Called pt and conveyed message below    Pt states understanding and will await for new PA   S: Patient recently extubated in ICU and complains of pain and foreign body sensation OD since extubation  Vision is blurry  POHx: hit in eye with an icy snowball as a child  Lost vision but it came back  O: Va (sc, near card) 20/25 OU  Tp: 8mmHg OD, 12mmHg OS  EOM: full OU  VF: FTFC OU    Anterior segment exam:  OD: mild injection, small, inferior corneal abrasion   OS: white and quiet    Dilated exam:  OU: WNL    Imp; Corneal abrasion OD    Rec: start erythromycin ophthalmic ointment to OD q 2hours    Expect resolution of abrasion and discomfort in the next 24 hours